# Patient Record
Sex: FEMALE | Race: WHITE | NOT HISPANIC OR LATINO | Employment: OTHER | URBAN - METROPOLITAN AREA
[De-identification: names, ages, dates, MRNs, and addresses within clinical notes are randomized per-mention and may not be internally consistent; named-entity substitution may affect disease eponyms.]

---

## 2023-02-11 ENCOUNTER — HOSPITAL ENCOUNTER (INPATIENT)
Facility: CLINIC | Age: 68
LOS: 3 days | Discharge: HOME | DRG: 872 | End: 2023-02-14
Attending: EMERGENCY MEDICINE | Admitting: FAMILY MEDICINE
Payer: MEDICARE

## 2023-02-11 ENCOUNTER — APPOINTMENT (OUTPATIENT)
Dept: CT IMAGING | Facility: CLINIC | Age: 68
DRG: 872 | End: 2023-02-11
Attending: EMERGENCY MEDICINE
Payer: MEDICARE

## 2023-02-11 DIAGNOSIS — L03.311 CELLULITIS OF ABDOMINAL WALL: Primary | ICD-10-CM

## 2023-02-11 DIAGNOSIS — L02.211 ABDOMINAL WALL ABSCESS: ICD-10-CM

## 2023-02-11 DIAGNOSIS — R50.9 FEVER, UNSPECIFIED FEVER CAUSE: ICD-10-CM

## 2023-02-11 DIAGNOSIS — D72.829 LEUKOCYTOSIS, UNSPECIFIED TYPE: ICD-10-CM

## 2023-02-11 DIAGNOSIS — E87.6 HYPOKALEMIA: ICD-10-CM

## 2023-02-11 DIAGNOSIS — L03.311 ABDOMINAL WALL CELLULITIS: ICD-10-CM

## 2023-02-11 DIAGNOSIS — A41.9 SEPSIS WITHOUT ACUTE ORGAN DYSFUNCTION, DUE TO UNSPECIFIED ORGANISM (H): ICD-10-CM

## 2023-02-11 PROBLEM — E78.00 PURE HYPERCHOLESTEROLEMIA: Status: ACTIVE | Noted: 2023-02-11

## 2023-02-11 PROBLEM — F32.A ANXIETY AND DEPRESSION: Status: ACTIVE | Noted: 2017-01-09

## 2023-02-11 PROBLEM — F41.9 ANXIETY AND DEPRESSION: Status: ACTIVE | Noted: 2017-01-09

## 2023-02-11 PROBLEM — G47.33 OBSTRUCTIVE SLEEP APNEA ON CPAP: Status: ACTIVE | Noted: 2023-02-11

## 2023-02-11 LAB
ALBUMIN SERPL-MCNC: 3.2 G/DL (ref 3.5–5)
ALP SERPL-CCNC: 92 U/L (ref 45–120)
ALT SERPL W P-5'-P-CCNC: 23 U/L (ref 0–45)
ANION GAP SERPL CALCULATED.3IONS-SCNC: 11 MMOL/L (ref 5–18)
AST SERPL W P-5'-P-CCNC: 22 U/L (ref 0–40)
BASOPHILS # BLD AUTO: 0.1 10E3/UL (ref 0–0.2)
BASOPHILS NFR BLD AUTO: 0 %
BILIRUB SERPL-MCNC: 0.6 MG/DL (ref 0–1)
BUN SERPL-MCNC: 16 MG/DL (ref 8–22)
CALCIUM SERPL-MCNC: 8.9 MG/DL (ref 8.5–10.5)
CHLORIDE BLD-SCNC: 109 MMOL/L (ref 98–107)
CO2 SERPL-SCNC: 20 MMOL/L (ref 22–31)
CREAT SERPL-MCNC: 0.65 MG/DL (ref 0.6–1.1)
EOSINOPHIL # BLD AUTO: 0.2 10E3/UL (ref 0–0.7)
EOSINOPHIL NFR BLD AUTO: 1 %
ERYTHROCYTE [DISTWIDTH] IN BLOOD BY AUTOMATED COUNT: 13.7 % (ref 10–15)
GFR SERPL CREATININE-BSD FRML MDRD: >90 ML/MIN/1.73M2
GLUCOSE BLD-MCNC: 132 MG/DL (ref 70–125)
HCT VFR BLD AUTO: 37.8 % (ref 35–47)
HGB BLD-MCNC: 12.5 G/DL (ref 11.7–15.7)
IMM GRANULOCYTES # BLD: 0.1 10E3/UL
IMM GRANULOCYTES NFR BLD: 0 %
LACTATE SERPL-SCNC: 0.7 MMOL/L (ref 0.7–2)
LYMPHOCYTES # BLD AUTO: 2.5 10E3/UL (ref 0.8–5.3)
LYMPHOCYTES NFR BLD AUTO: 14 %
MCH RBC QN AUTO: 31.1 PG (ref 26.5–33)
MCHC RBC AUTO-ENTMCNC: 33.1 G/DL (ref 31.5–36.5)
MCV RBC AUTO: 94 FL (ref 78–100)
MONOCYTES # BLD AUTO: 1.3 10E3/UL (ref 0–1.3)
MONOCYTES NFR BLD AUTO: 8 %
NEUTROPHILS # BLD AUTO: 13 10E3/UL (ref 1.6–8.3)
NEUTROPHILS NFR BLD AUTO: 77 %
NRBC # BLD AUTO: 0 10E3/UL
NRBC BLD AUTO-RTO: 0 /100
PLATELET # BLD AUTO: 202 10E3/UL (ref 150–450)
POTASSIUM BLD-SCNC: 3.4 MMOL/L (ref 3.5–5)
PROT SERPL-MCNC: 6.4 G/DL (ref 6–8)
RBC # BLD AUTO: 4.02 10E6/UL (ref 3.8–5.2)
SARS-COV-2 RNA RESP QL NAA+PROBE: NEGATIVE
SODIUM SERPL-SCNC: 140 MMOL/L (ref 136–145)
WBC # BLD AUTO: 17.1 10E3/UL (ref 4–11)

## 2023-02-11 PROCEDURE — 74177 CT ABD & PELVIS W/CONTRAST: CPT | Mod: MG

## 2023-02-11 PROCEDURE — 5A09357 ASSISTANCE WITH RESPIRATORY VENTILATION, LESS THAN 24 CONSECUTIVE HOURS, CONTINUOUS POSITIVE AIRWAY PRESSURE: ICD-10-PCS | Performed by: FAMILY MEDICINE

## 2023-02-11 PROCEDURE — 250N000011 HC RX IP 250 OP 636: Performed by: EMERGENCY MEDICINE

## 2023-02-11 PROCEDURE — 87077 CULTURE AEROBIC IDENTIFY: CPT | Performed by: EMERGENCY MEDICINE

## 2023-02-11 PROCEDURE — G1010 CDSM STANSON: HCPCS

## 2023-02-11 PROCEDURE — 87040 BLOOD CULTURE FOR BACTERIA: CPT | Performed by: EMERGENCY MEDICINE

## 2023-02-11 PROCEDURE — C9803 HOPD COVID-19 SPEC COLLECT: HCPCS

## 2023-02-11 PROCEDURE — 120N000001 HC R&B MED SURG/OB

## 2023-02-11 PROCEDURE — 83605 ASSAY OF LACTIC ACID: CPT | Performed by: EMERGENCY MEDICINE

## 2023-02-11 PROCEDURE — U0003 INFECTIOUS AGENT DETECTION BY NUCLEIC ACID (DNA OR RNA); SEVERE ACUTE RESPIRATORY SYNDROME CORONAVIRUS 2 (SARS-COV-2) (CORONAVIRUS DISEASE [COVID-19]), AMPLIFIED PROBE TECHNIQUE, MAKING USE OF HIGH THROUGHPUT TECHNOLOGIES AS DESCRIBED BY CMS-2020-01-R: HCPCS | Performed by: EMERGENCY MEDICINE

## 2023-02-11 PROCEDURE — 96365 THER/PROPH/DIAG IV INF INIT: CPT | Mod: 59

## 2023-02-11 PROCEDURE — 80053 COMPREHEN METABOLIC PANEL: CPT | Performed by: EMERGENCY MEDICINE

## 2023-02-11 PROCEDURE — 36415 COLL VENOUS BLD VENIPUNCTURE: CPT | Performed by: EMERGENCY MEDICINE

## 2023-02-11 PROCEDURE — 87205 SMEAR GRAM STAIN: CPT | Performed by: EMERGENCY MEDICINE

## 2023-02-11 PROCEDURE — 85025 COMPLETE CBC W/AUTO DIFF WBC: CPT | Performed by: EMERGENCY MEDICINE

## 2023-02-11 PROCEDURE — 96375 TX/PRO/DX INJ NEW DRUG ADDON: CPT

## 2023-02-11 PROCEDURE — 250N000013 HC RX MED GY IP 250 OP 250 PS 637: Performed by: EMERGENCY MEDICINE

## 2023-02-11 PROCEDURE — 99285 EMERGENCY DEPT VISIT HI MDM: CPT | Mod: 25

## 2023-02-11 PROCEDURE — 258N000003 HC RX IP 258 OP 636: Performed by: EMERGENCY MEDICINE

## 2023-02-11 PROCEDURE — 86140 C-REACTIVE PROTEIN: CPT | Performed by: STUDENT IN AN ORGANIZED HEALTH CARE EDUCATION/TRAINING PROGRAM

## 2023-02-11 RX ORDER — MORPHINE SULFATE 4 MG/ML
4 INJECTION, SOLUTION INTRAMUSCULAR; INTRAVENOUS ONCE
Status: COMPLETED | OUTPATIENT
Start: 2023-02-11 | End: 2023-02-11

## 2023-02-11 RX ORDER — PREDNISONE 20 MG/1
20 TABLET ORAL PRN
COMMUNITY

## 2023-02-11 RX ORDER — ALBUTEROL SULFATE 0.83 MG/ML
2.5 SOLUTION RESPIRATORY (INHALATION) EVERY 4 HOURS PRN
COMMUNITY

## 2023-02-11 RX ORDER — FLUTICASONE PROPIONATE 50 MCG
1 SPRAY, SUSPENSION (ML) NASAL DAILY PRN
COMMUNITY

## 2023-02-11 RX ORDER — CEPHALEXIN 500 MG/1
500 CAPSULE ORAL 3 TIMES DAILY
Status: ON HOLD | COMMUNITY
End: 2023-02-14

## 2023-02-11 RX ORDER — POTASSIUM CHLORIDE 1500 MG/1
20 TABLET, EXTENDED RELEASE ORAL ONCE
Status: COMPLETED | OUTPATIENT
Start: 2023-02-11 | End: 2023-02-11

## 2023-02-11 RX ORDER — MOMETASONE FUROATE 1 MG/G
CREAM TOPICAL DAILY
COMMUNITY

## 2023-02-11 RX ORDER — PIPERACILLIN SODIUM, TAZOBACTAM SODIUM 3; .375 G/15ML; G/15ML
3.38 INJECTION, POWDER, LYOPHILIZED, FOR SOLUTION INTRAVENOUS ONCE
Status: COMPLETED | OUTPATIENT
Start: 2023-02-11 | End: 2023-02-11

## 2023-02-11 RX ORDER — MULTIVITAMIN,THERAPEUTIC
1 TABLET ORAL DAILY
COMMUNITY

## 2023-02-11 RX ORDER — MONTELUKAST SODIUM 10 MG/1
10 TABLET ORAL DAILY
COMMUNITY

## 2023-02-11 RX ORDER — FLUTICASONE FUROATE AND VILANTEROL 200; 25 UG/1; UG/1
1 POWDER RESPIRATORY (INHALATION) DAILY
COMMUNITY

## 2023-02-11 RX ORDER — IOPAMIDOL 755 MG/ML
90 INJECTION, SOLUTION INTRAVASCULAR ONCE
Status: COMPLETED | OUTPATIENT
Start: 2023-02-11 | End: 2023-02-11

## 2023-02-11 RX ORDER — SERTRALINE HYDROCHLORIDE 100 MG/1
100 TABLET, FILM COATED ORAL DAILY
COMMUNITY

## 2023-02-11 RX ORDER — IPRATROPIUM BROMIDE AND ALBUTEROL SULFATE 2.5; .5 MG/3ML; MG/3ML
1 SOLUTION RESPIRATORY (INHALATION) EVERY 6 HOURS PRN
COMMUNITY

## 2023-02-11 RX ADMIN — VANCOMYCIN HYDROCHLORIDE 2500 MG: 5 INJECTION, POWDER, LYOPHILIZED, FOR SOLUTION INTRAVENOUS at 23:56

## 2023-02-11 RX ADMIN — MORPHINE SULFATE 4 MG: 4 INJECTION, SOLUTION INTRAMUSCULAR; INTRAVENOUS at 22:36

## 2023-02-11 RX ADMIN — POTASSIUM CHLORIDE 20 MEQ: 1500 TABLET, EXTENDED RELEASE ORAL at 23:13

## 2023-02-11 RX ADMIN — PIPERACILLIN AND TAZOBACTAM 3.38 G: 3; .375 INJECTION, POWDER, FOR SOLUTION INTRAVENOUS at 22:44

## 2023-02-11 RX ADMIN — IOPAMIDOL 90 ML: 755 INJECTION, SOLUTION INTRAVENOUS at 23:36

## 2023-02-11 ASSESSMENT — ACTIVITIES OF DAILY LIVING (ADL): ADLS_ACUITY_SCORE: 35

## 2023-02-12 LAB
ANION GAP SERPL CALCULATED.3IONS-SCNC: 8 MMOL/L (ref 5–18)
BUN SERPL-MCNC: 12 MG/DL (ref 8–22)
C REACTIVE PROTEIN LHE: 15.3 MG/DL (ref 0–?)
C REACTIVE PROTEIN LHE: 19.2 MG/DL (ref 0–?)
CALCIUM SERPL-MCNC: 8.9 MG/DL (ref 8.5–10.5)
CHLORIDE BLD-SCNC: 111 MMOL/L (ref 98–107)
CO2 SERPL-SCNC: 23 MMOL/L (ref 22–31)
CREAT SERPL-MCNC: 0.64 MG/DL (ref 0.6–1.1)
ERYTHROCYTE [DISTWIDTH] IN BLOOD BY AUTOMATED COUNT: 13.7 % (ref 10–15)
GFR SERPL CREATININE-BSD FRML MDRD: >90 ML/MIN/1.73M2
GLUCOSE BLD-MCNC: 99 MG/DL (ref 70–125)
HCT VFR BLD AUTO: 39 % (ref 35–47)
HGB BLD-MCNC: 12.6 G/DL (ref 11.7–15.7)
MCH RBC QN AUTO: 31.1 PG (ref 26.5–33)
MCHC RBC AUTO-ENTMCNC: 32.3 G/DL (ref 31.5–36.5)
MCV RBC AUTO: 96 FL (ref 78–100)
PLATELET # BLD AUTO: 206 10E3/UL (ref 150–450)
POTASSIUM BLD-SCNC: 4 MMOL/L (ref 3.5–5)
RBC # BLD AUTO: 4.05 10E6/UL (ref 3.8–5.2)
SODIUM SERPL-SCNC: 142 MMOL/L (ref 136–145)
WBC # BLD AUTO: 14.1 10E3/UL (ref 4–11)

## 2023-02-12 PROCEDURE — 36415 COLL VENOUS BLD VENIPUNCTURE: CPT | Performed by: STUDENT IN AN ORGANIZED HEALTH CARE EDUCATION/TRAINING PROGRAM

## 2023-02-12 PROCEDURE — 120N000001 HC R&B MED SURG/OB

## 2023-02-12 PROCEDURE — 85027 COMPLETE CBC AUTOMATED: CPT | Performed by: STUDENT IN AN ORGANIZED HEALTH CARE EDUCATION/TRAINING PROGRAM

## 2023-02-12 PROCEDURE — 250N000011 HC RX IP 250 OP 636: Performed by: STUDENT IN AN ORGANIZED HEALTH CARE EDUCATION/TRAINING PROGRAM

## 2023-02-12 PROCEDURE — 80048 BASIC METABOLIC PNL TOTAL CA: CPT | Performed by: STUDENT IN AN ORGANIZED HEALTH CARE EDUCATION/TRAINING PROGRAM

## 2023-02-12 PROCEDURE — 258N000003 HC RX IP 258 OP 636: Performed by: STUDENT IN AN ORGANIZED HEALTH CARE EDUCATION/TRAINING PROGRAM

## 2023-02-12 PROCEDURE — 99223 1ST HOSP IP/OBS HIGH 75: CPT | Mod: AI | Performed by: STUDENT IN AN ORGANIZED HEALTH CARE EDUCATION/TRAINING PROGRAM

## 2023-02-12 PROCEDURE — 250N000013 HC RX MED GY IP 250 OP 250 PS 637: Performed by: STUDENT IN AN ORGANIZED HEALTH CARE EDUCATION/TRAINING PROGRAM

## 2023-02-12 PROCEDURE — 86140 C-REACTIVE PROTEIN: CPT | Performed by: STUDENT IN AN ORGANIZED HEALTH CARE EDUCATION/TRAINING PROGRAM

## 2023-02-12 RX ORDER — ENOXAPARIN SODIUM 100 MG/ML
40 INJECTION SUBCUTANEOUS EVERY 24 HOURS
Status: DISCONTINUED | OUTPATIENT
Start: 2023-02-12 | End: 2023-02-14 | Stop reason: HOSPADM

## 2023-02-12 RX ORDER — CETIRIZINE HYDROCHLORIDE 10 MG/1
10 TABLET ORAL DAILY PRN
Status: DISCONTINUED | OUTPATIENT
Start: 2023-02-12 | End: 2023-02-14 | Stop reason: HOSPADM

## 2023-02-12 RX ORDER — IPRATROPIUM BROMIDE AND ALBUTEROL SULFATE 2.5; .5 MG/3ML; MG/3ML
1 SOLUTION RESPIRATORY (INHALATION) EVERY 6 HOURS PRN
Status: DISCONTINUED | OUTPATIENT
Start: 2023-02-12 | End: 2023-02-14 | Stop reason: HOSPADM

## 2023-02-12 RX ORDER — FLUTICASONE FUROATE AND VILANTEROL 200; 25 UG/1; UG/1
1 POWDER RESPIRATORY (INHALATION) DAILY
Status: DISCONTINUED | OUTPATIENT
Start: 2023-02-12 | End: 2023-02-14 | Stop reason: HOSPADM

## 2023-02-12 RX ORDER — ONDANSETRON 4 MG/1
4 TABLET, ORALLY DISINTEGRATING ORAL EVERY 6 HOURS PRN
Status: DISCONTINUED | OUTPATIENT
Start: 2023-02-12 | End: 2023-02-14 | Stop reason: HOSPADM

## 2023-02-12 RX ORDER — ALBUTEROL SULFATE 0.83 MG/ML
2.5 SOLUTION RESPIRATORY (INHALATION) EVERY 4 HOURS PRN
Status: DISCONTINUED | OUTPATIENT
Start: 2023-02-12 | End: 2023-02-14 | Stop reason: HOSPADM

## 2023-02-12 RX ORDER — POLYETHYLENE GLYCOL 3350 17 G/17G
17 POWDER, FOR SOLUTION ORAL DAILY PRN
Status: DISCONTINUED | OUTPATIENT
Start: 2023-02-12 | End: 2023-02-14 | Stop reason: HOSPADM

## 2023-02-12 RX ORDER — PIPERACILLIN SODIUM, TAZOBACTAM SODIUM 3; .375 G/15ML; G/15ML
3.38 INJECTION, POWDER, LYOPHILIZED, FOR SOLUTION INTRAVENOUS EVERY 8 HOURS
Status: DISCONTINUED | OUTPATIENT
Start: 2023-02-12 | End: 2023-02-13

## 2023-02-12 RX ORDER — ACETAMINOPHEN 325 MG/1
650 TABLET ORAL EVERY 6 HOURS PRN
Status: DISCONTINUED | OUTPATIENT
Start: 2023-02-12 | End: 2023-02-14 | Stop reason: HOSPADM

## 2023-02-12 RX ORDER — PANTOPRAZOLE SODIUM 40 MG/1
40 TABLET, DELAYED RELEASE ORAL 2 TIMES DAILY
Status: DISCONTINUED | OUTPATIENT
Start: 2023-02-12 | End: 2023-02-14 | Stop reason: HOSPADM

## 2023-02-12 RX ORDER — ONDANSETRON 2 MG/ML
4 INJECTION INTRAMUSCULAR; INTRAVENOUS EVERY 6 HOURS PRN
Status: DISCONTINUED | OUTPATIENT
Start: 2023-02-12 | End: 2023-02-14 | Stop reason: HOSPADM

## 2023-02-12 RX ORDER — ACETAMINOPHEN 650 MG/1
650 SUPPOSITORY RECTAL EVERY 6 HOURS PRN
Status: DISCONTINUED | OUTPATIENT
Start: 2023-02-12 | End: 2023-02-14 | Stop reason: HOSPADM

## 2023-02-12 RX ORDER — LIDOCAINE 40 MG/G
CREAM TOPICAL
Status: DISCONTINUED | OUTPATIENT
Start: 2023-02-12 | End: 2023-02-14 | Stop reason: HOSPADM

## 2023-02-12 RX ORDER — MONTELUKAST SODIUM 10 MG/1
10 TABLET ORAL DAILY
Status: DISCONTINUED | OUTPATIENT
Start: 2023-02-12 | End: 2023-02-14 | Stop reason: HOSPADM

## 2023-02-12 RX ADMIN — VANCOMYCIN HYDROCHLORIDE 1750 MG: 5 INJECTION, POWDER, LYOPHILIZED, FOR SOLUTION INTRAVENOUS at 18:02

## 2023-02-12 RX ADMIN — ACETAMINOPHEN 650 MG: 325 TABLET ORAL at 05:12

## 2023-02-12 RX ADMIN — PIPERACILLIN AND TAZOBACTAM 3.38 G: 3; .375 INJECTION, POWDER, FOR SOLUTION INTRAVENOUS at 20:47

## 2023-02-12 RX ADMIN — PANTOPRAZOLE SODIUM 40 MG: 40 TABLET, DELAYED RELEASE ORAL at 08:17

## 2023-02-12 RX ADMIN — SERTRALINE 100 MG: 50 TABLET, FILM COATED ORAL at 08:17

## 2023-02-12 RX ADMIN — ACETAMINOPHEN 650 MG: 325 TABLET ORAL at 14:47

## 2023-02-12 RX ADMIN — PIPERACILLIN AND TAZOBACTAM 3.38 G: 3; .375 INJECTION, POWDER, FOR SOLUTION INTRAVENOUS at 05:02

## 2023-02-12 RX ADMIN — ENOXAPARIN SODIUM 40 MG: 100 INJECTION SUBCUTANEOUS at 05:02

## 2023-02-12 RX ADMIN — PIPERACILLIN AND TAZOBACTAM 3.38 G: 3; .375 INJECTION, POWDER, FOR SOLUTION INTRAVENOUS at 12:26

## 2023-02-12 RX ADMIN — PANTOPRAZOLE SODIUM 40 MG: 40 TABLET, DELAYED RELEASE ORAL at 19:54

## 2023-02-12 RX ADMIN — FLUTICASONE FUROATE AND VILANTEROL 1 PUFF: 200; 25 POWDER RESPIRATORY (INHALATION) at 09:32

## 2023-02-12 RX ADMIN — MONTELUKAST 10 MG: 10 TABLET, FILM COATED ORAL at 08:18

## 2023-02-12 RX ADMIN — ACETAMINOPHEN 650 MG: 325 TABLET ORAL at 23:42

## 2023-02-12 ASSESSMENT — ACTIVITIES OF DAILY LIVING (ADL)
ADLS_ACUITY_SCORE: 20
ADLS_ACUITY_SCORE: 37
ADLS_ACUITY_SCORE: 20
ADLS_ACUITY_SCORE: 20
ADLS_ACUITY_SCORE: 35
ADLS_ACUITY_SCORE: 20
ADLS_ACUITY_SCORE: 37

## 2023-02-12 NOTE — PHARMACY-VANCOMYCIN DOSING SERVICE
Pharmacy Vancomycin Initial Note  Date of Service 2023  Patient's  1955  67 year old, female    Indication: Skin and Soft Tissue Infection    Current estimated CrCl = Estimated Creatinine Clearance: 109.7 mL/min (based on SCr of 0.65 mg/dL).    Creatinine for last 3 days  2023: 10:30 PM Creatinine 0.65 mg/dL    Recent Vancomycin Level(s) for last 3 days  No results found for requested labs within last 72 hours.      Vancomycin IV Administrations (past 72 hours)                   vancomycin (VANCOCIN) 2,500 mg in sodium chloride 0.9 % 500 mL intermittent infusion (mg) 2,500 mg New Bag 23 2356                Nephrotoxins and other renal medications (From now, onward)    Start     Dose/Rate Route Frequency Ordered Stop    23 1800  vancomycin (VANCOCIN) 1,750 mg in 0.9% NaCl 500 mL intermittent infusion         1,750 mg  over 2 Hours Intravenous EVERY 18 HOURS 23 0130      23 0430  piperacillin-tazobactam (ZOSYN) 3.375 g vial to attach to  mL bag         3.375 g  over 240 Minutes Intravenous EVERY 8 HOURS 23 0114      23 2300  vancomycin (VANCOCIN) 2,500 mg in sodium chloride 0.9 % 500 mL intermittent infusion         2,500 mg  over 120 Minutes Intravenous ONCE 23 2250            Contrast Orders - past 72 hours (72h ago, onward)    Start     Dose/Rate Route Frequency Stop    23 2330  iopamidol (ISOVUE-370) solution 90 mL         90 mL Intravenous ONCE 23 2336          [employer] Prediction of Planned Initial Vancomycin Regimen  Regimen: 1750 mg IV every 18 hours.  Start time: 1800 on 2023  Exposure target: AUC24 (range)400-600 mg/L.hr   AUC24,ss: 568 mg/L.hr  Probability of AUC24 > 400: 74 %  Ctrough,ss: 13.6 mg/L  Probability of Ctrough,ss > 20: 34 %  Probability of nephrotoxicity (Lodise BLANCA ): 9 %          Plan:  1. Start vancomycin  1750 mg IV q18h.   2. Vancomycin monitoring method: AUC  3. Vancomycin therapeutic  monitoring goal: 400-600 mg*h/L  4. Pharmacy will check vancomycin levels as appropriate in 1-3 Days.    5. Serum creatinine levels will be ordered every 48 hours.      Vikas Hogan RP

## 2023-02-12 NOTE — PHARMACY-VANCOMYCIN DOSING SERVICE
Pharmacy Vancomycin Initial Note  Date of Service 2023  Patient's  1955  67 year old, female    Indication: Sepsis and Skin and Soft Tissue Infection    Current estimated CrCl = CrCl cannot be calculated (No successful lab value found.).    Creatinine for last 3 days  No results found for requested labs within last 72 hours.    Recent Vancomycin Level(s) for last 3 days  No results found for requested labs within last 72 hours.      Vancomycin IV Administrations (past 72 hours)      No vancomycin orders with administrations in past 72 hours.                Nephrotoxins and other renal medications (From now, onward)    Start     Dose/Rate Route Frequency Ordered Stop    23 2300  vancomycin (VANCOCIN) 2,500 mg in sodium chloride 0.9 % 500 mL intermittent infusion         2,500 mg  over 120 Minutes Intravenous ONCE 23 2250      23 2200  piperacillin-tazobactam (ZOSYN) 3.375 g vial to attach to  mL bag         3.375 g  over 30 Minutes Intravenous ONCE 23 2144            Contrast Orders - past 72 hours (72h ago, onward)    None                  Plan:  1. Start vancomycin  2500 mg IV x1 in ED.   2. If Vancomycin to continue as inpatient, please order pharmacy to dose consult.    Joshua Lantigua, [facility]

## 2023-02-12 NOTE — ED PROVIDER NOTES
EMERGENCY DEPARTMENT ENCOUNTER      NAME: Bernice Umaña  AGE: 67 year old female  YOB: 1955  MRN: [id number]  EVALUATION DATE & TIME: 2023  9:36 PM    PCP: Lyly Tesfaye    ED PROVIDER: Ann Everett MD    Chief Complaint   Patient presents with     Rash     Fever         FINAL IMPRESSION:  1. Cellulitis of abdominal wall    2. Abdominal wall cellulitis    3. Abdominal wall abscess    4. Sepsis without acute organ dysfunction, due to unspecified organism (H)    5. Fever, unspecified fever cause    6. Leukocytosis, unspecified type    7. Hypokalemia          ED COURSE & MEDICAL DECISION MAKIN:43 PM I met with patient for initial interview and encounter. PPE worn includes surgical mask.    Pertinent Labs & Imaging studies reviewed. (See chart for details)  67 year old female with history of anxiety/depression, HLD, asthma, GLDAYS on CPAP who presents to the Emergency Department for evaluation of 4 days of a rash on her abdominal wall with new fever to 102 today.  Patient has erythema of the abdominal wall with a small amount of purulent drainage, associated induration consistent with abdominal wall abscess, cellulitis.  With fever at home concerns for sepsis or peritoneal spread though really the remainder of her abdomen is benign.    Patient placed on monitor, IV established and blood obtained.  Given morphine for pain, Zosyn/vancomycin.  CBC, CMP, lactate, blood culture, wound culture, COVID swab notable for WBC of 17.1, potassium 3.4 replaced orally.  CT abdomen pelvis cellulitis.  Clinically nothing to suggest necrotizing infection.       ED Course as of 02/15/23 2050   Sat 2023   2245 WBC(!): 17.1   2301 Potassium(!): 3.4       Medical Decision Making    History:    Supplemental history from: Family Member/Significant Other    External Record(s) reviewed: Documented in chart, if applicable.    Work Up:    Chart documentation includes differential considered and any EKGs or imaging  independently interpreted by provider, where specified.    In additional to work up documented, I considered the following work up: Documented in chart, if applicable.    External consultation:    Discussion of management with another provider: Documented in chart, if applicable    Complicating factors:    Care impacted by chronic illness: Chronic Lung Disease and Hyperlipidemia    Care affected by social determinants of health: N/A    Disposition considerations: Admit.        At the conclusion of the encounter I discussed the results of all of the tests and the disposition. The questions were answered. The patient or family acknowledged understanding and was agreeable with the care plan.    CONSULTS:  Hospitalist    CRITICAL CARE:  Critical Care  Performed by: Ann Everett MD  Authorized by: Ann Everett MD     Total critical care time: 35 minutes  Criticalcare time was exclusive of separately billable procedures and treating other patients.  Critical care was necessary to treat or prevent imminent or life-threatening deterioration of the following conditions: Sepsis  Critical care was time spent personally by me on the following activities: development of treatment plan with patient or surrogate, discussions with consultants, examination of patient, evaluation of patient's response totreatment, obtaining history from patient or surrogate, ordering and performing treatments and interventions, ordering and review of laboratory studies, ordering and review of radiographic studies and re-evaluation ofpatient's condition, this excludes any separately billable procedures.      MEDICATIONS GIVEN IN THE EMERGENCY:  Medications   piperacillin-tazobactam (ZOSYN) 3.375 g vial to attach to  mL bag (0 g Intravenous Stopped 2/11/23 2315)   morphine (PF) injection 4 mg (4 mg Intravenous Given 2/11/23 2236)   vancomycin (VANCOCIN) 2,500 mg in sodium chloride 0.9 % 500 mL intermittent infusion (2,500 mg Intravenous New  Bag 23 2751)   potassium chloride ER (KLOR-CON M) CR tablet 20 mEq (20 mEq Oral Given 23 2313)   iopamidol (ISOVUE-370) solution 90 mL (90 mLs Intravenous Given 23 2406)       NEW PRESCRIPTIONS STARTED AT TODAY'S ER VISIT  Discharge Medication List as of 2023 11:02 AM             =================================================================    HPI    Patient information was obtained from: Patient     Use of Intrepreter: N/A        Bernice Umaña is a 67 year old female with pertinent medical history of GERD, anxiety and depression, who presents via walk-in for evaluation of rash, fever.    Patient reports she has had an area of redness surround a small pustule on her left upper abdomin which has been spreading for the pas 4 days. She adds that this pustule began to drain about 2 days ago, and she recorded a fever of 102.5 at home. She has been taking tylenol for this. She was seen for a virtual visit yesterday and was started on a course of cephalexin. She has had 3 doses thus far and has not had any improvement. She denies any other complaints.      REVIEW OF SYSTEMS  Constitutional:  Denies fever, chills, weight loss or weakness  GI:  Denies abdominal pain, nausea, vomiting, diarrhea  : Denies dysuria, denies hematuria  Skin: Positive for redness and drainage to abdomin. Denies rash, pallor      PAST MEDICAL HISTORY:  Past Medical History:   Diagnosis Date     Arthritis      Asthma      Gastro-oesophageal reflux disease        PAST SURGICAL HISTORY:  Past Surgical History:   Procedure Laterality Date     ARTHROPLASTY KNEE BILATERAL  10/8/2012    Procedure: ARTHROPLASTY KNEE BILATERAL;  Bilateral Total Knee Arthroplasty;  Surgeon: Nino Andre MD;  Location: [facility] OR     HYSTERECTOMY         CURRENT MEDICATIONS:    Prior to Admission Medications   Prescriptions Last Dose Informant Patient Reported? Taking?   COMPRESSION STOCKINGS   No No   Si each continuous.   COMPRESSION STOCKINGS    No No   Si each continuous.   OMEPRAZOLE PO 2023 at am  Yes Yes   Sig: Take 40 mg by mouth daily   VITAMIN D, CHOLECALCIFEROL, PO 2023 at am  Yes Yes   Sig: Take 2,000 Units by mouth daily   albuterol (PROVENTIL) (2.5 MG/3ML) 0.083% neb solution prn  Yes Yes   Sig: Take 2.5 mg by nebulization every 4 hours as needed for shortness of breath, wheezing or cough   cephALEXin (KEFLEX) 500 MG capsule 2023 at x3  Yes No   Sig: Take 500 mg by mouth 3 times daily   cetirizine (ZYRTEC) 10 MG tablet 2023 at am  Yes Yes   Sig: Take 10 mg by mouth daily as needed.     fluticasone (FLONASE) 50 MCG/ACT nasal spray Past Month  Yes Yes   Sig: Spray 1 spray into both nostrils daily as needed for rhinitis or allergies   fluticasone-vilanterol (BREO ELLIPTA) 200-25 MCG/ACT inhaler 2023 at am - family can bring in  Yes Yes   Sig: Inhale 1 puff into the lungs daily   ipratropium - albuterol 0.5 mg/2.5 mg/3 mL (DUONEB) 0.5-2.5 (3) MG/3ML neb solution Past Month  Yes Yes   Sig: Take 1 vial by nebulization every 6 hours as needed for shortness of breath, wheezing or cough   metroNIDAZOLE (METROCREAM) 0.75 % external cream 2023 at pm  Yes No   Sig: Apply topically 2 times daily   mometasone (ELOCON) 0.1 % external cream 2023 at am  Yes Yes   Sig: Apply topically daily   montelukast (SINGULAIR) 10 MG tablet 2023 at am  Yes Yes   Sig: Take 10 mg by mouth daily   multivitamin, therapeutic (THERA-VIT) TABS tablet 2023 at am  Yes Yes   Sig: Take 1 tablet by mouth daily   predniSONE (DELTASONE) 20 MG tablet More than a month  Yes Yes   Sig: Take 20 mg by mouth as needed As directed   sertraline (ZOLOFT) 100 MG tablet 2023  Yes Yes   Sig: Take 100 mg by mouth daily      Facility-Administered Medications: None       ALLERGIES:  Allergies   Allergen Reactions     Sulfa Drugs Hives     Tramadol Nausea     Macrobid [Nitrofurantoin Anhydrous] Rash       FAMILY HISTORY:  No family history on  file.    SOCIAL HISTORY:  Social History     Tobacco Use     Smoking status: Never     Smokeless tobacco: Never   Substance Use Topics     Alcohol use: Yes     Comment: 2 monthly     Drug use: No        VITALS:  No data found.    PHYSICAL EXAM    General Appearance: Well-appearing, well-nourished, no acute distress   Head:  Normocephalic  Eyes:  conjunctiva/corneas clear  ENT:  membranes are moist without pallor  Cardio:  Regular rate and rhythm  Pulm:  No respiratory distress  Back: No CVA tenderness, normal ROM  Abdomen:  Obese, soft, non-tender, non distended,no rebound or guarding.  Extremities: Normal gait  Skin: Large area of erythema to left upper abdominal wall with induration and a small pustule, able to express a small amount of purulent drainage, tender over area of erythema. Skin warm, dry,  Neuro:  Alert and oriented ×3, moving all extremities, no gross sensory defects               RADIOLOGY/LABS:  Reviewed all pertinent imaging. Please see official radiology report. All pertinent labs reviewed and interpreted.    Results for orders placed or performed during the hospital encounter of 02/11/23   CT Abdomen Pelvis w Contrast    Impression    IMPRESSION:   1.  Anterior left abdominal wall cellulitis, extending deep to abut the superficial fascia of the left abdominal wall musculature, which could reflect a superimposed superficial fasciitis. No soft tissue gas to suggest convincing CT evidence of a   necrotizing infection. This does not exclude a clinical diagnosis of necrotizing infection.  2.  No CT evidence of acute intraperitoneal process.       Comprehensive metabolic panel   Result Value Ref Range    Sodium 140 136 - 145 mmol/L    Potassium 3.4 (L) 3.5 - 5.0 mmol/L    Chloride 109 (H) 98 - 107 mmol/L    Carbon Dioxide (CO2) 20 (L) 22 - 31 mmol/L    Anion Gap 11 5 - 18 mmol/L    Urea Nitrogen 16 8 - 22 mg/dL    Creatinine 0.65 0.60 - 1.10 mg/dL    Calcium 8.9 8.5 - 10.5 mg/dL    Glucose 132 (H) 70 -  125 mg/dL    Alkaline Phosphatase 92 45 - 120 U/L    AST 22 0 - 40 U/L    ALT 23 0 - 45 U/L    Protein Total 6.4 6.0 - 8.0 g/dL    Albumin 3.2 (L) 3.5 - 5.0 g/dL    Bilirubin Total 0.6 0.0 - 1.0 mg/dL    GFR Estimate >90 >60 mL/min/1.73m2   Lactic acid whole blood   Result Value Ref Range    Lactic Acid 0.7 0.7 - 2.0 mmol/L   Asymptomatic COVID-19 Virus (Coronavirus) by PCR Nasopharyngeal    Specimen: Nasopharyngeal; Swab   Result Value Ref Range    SARS CoV2 PCR Negative Negative   CBC with platelets and differential   Result Value Ref Range    WBC Count 17.1 (H) 4.0 - 11.0 10e3/uL    RBC Count 4.02 3.80 - 5.20 10e6/uL    Hemoglobin 12.5 11.7 - 15.7 g/dL    Hematocrit 37.8 35.0 - 47.0 %    MCV 94 78 - 100 fL    MCH 31.1 26.5 - 33.0 pg    MCHC 33.1 31.5 - 36.5 g/dL    RDW 13.7 10.0 - 15.0 %    Platelet Count 202 150 - 450 10e3/uL    % Neutrophils 77 %    % Lymphocytes 14 %    % Monocytes 8 %    % Eosinophils 1 %    % Basophils 0 %    % Immature Granulocytes 0 %    NRBCs per 100 WBC 0 <1 /100    Absolute Neutrophils 13.0 (H) 1.6 - 8.3 10e3/uL    Absolute Lymphocytes 2.5 0.8 - 5.3 10e3/uL    Absolute Monocytes 1.3 0.0 - 1.3 10e3/uL    Absolute Eosinophils 0.2 0.0 - 0.7 10e3/uL    Absolute Basophils 0.1 0.0 - 0.2 10e3/uL    Absolute Immature Granulocytes 0.1 <=0.4 10e3/uL    Absolute NRBCs 0.0 10e3/uL   CRP inflammation   Result Value Ref Range    CRP 15.3 (H) 0.0 - <0.8 mg/dL   Basic metabolic panel   Result Value Ref Range    Sodium 142 136 - 145 mmol/L    Potassium 4.0 3.5 - 5.0 mmol/L    Chloride 111 (H) 98 - 107 mmol/L    Carbon Dioxide (CO2) 23 22 - 31 mmol/L    Anion Gap 8 5 - 18 mmol/L    Urea Nitrogen 12 8 - 22 mg/dL    Creatinine 0.64 0.60 - 1.10 mg/dL    Calcium 8.9 8.5 - 10.5 mg/dL    Glucose 99 70 - 125 mg/dL    GFR Estimate >90 >60 mL/min/1.73m2   CBC with platelets   Result Value Ref Range    WBC Count 14.1 (H) 4.0 - 11.0 10e3/uL    RBC Count 4.05 3.80 - 5.20 10e6/uL    Hemoglobin 12.6 11.7 - 15.7 g/dL     Hematocrit 39.0 35.0 - 47.0 %    MCV 96 78 - 100 fL    MCH 31.1 26.5 - 33.0 pg    MCHC 32.3 31.5 - 36.5 g/dL    RDW 13.7 10.0 - 15.0 %    Platelet Count 206 150 - 450 10e3/uL   CRP inflammation   Result Value Ref Range    CRP 19.2 (H) 0.0 - <0.8 mg/dL   Creatinine   Result Value Ref Range    Creatinine 0.65 0.60 - 1.10 mg/dL    GFR Estimate >90 >60 mL/min/1.73m2   CBC with platelets   Result Value Ref Range    WBC Count 8.9 4.0 - 11.0 10e3/uL    RBC Count 3.84 3.80 - 5.20 10e6/uL    Hemoglobin 12.0 11.7 - 15.7 g/dL    Hematocrit 37.7 35.0 - 47.0 %    MCV 98 78 - 100 fL    MCH 31.3 26.5 - 33.0 pg    MCHC 31.8 31.5 - 36.5 g/dL    RDW 13.7 10.0 - 15.0 %    Platelet Count 217 150 - 450 10e3/uL   CRP inflammation   Result Value Ref Range    CRP 13.9 (H) 0.0 - <0.8 mg/dL   Basic metabolic panel   Result Value Ref Range    Sodium 145 136 - 145 mmol/L    Potassium 3.9 3.5 - 5.0 mmol/L    Chloride 112 (H) 98 - 107 mmol/L    Carbon Dioxide (CO2) 24 22 - 31 mmol/L    Anion Gap 9 5 - 18 mmol/L    Urea Nitrogen 9 8 - 22 mg/dL    Creatinine 0.65 0.60 - 1.10 mg/dL    Calcium 8.8 8.5 - 10.5 mg/dL    Glucose 91 70 - 125 mg/dL    GFR Estimate >90 >60 mL/min/1.73m2   Result Value Ref Range    Potassium 3.6 3.5 - 5.0 mmol/L   Extra Green Top (Lithium Heparin) Tube   Result Value Ref Range    Hold Specimen JIC    Extra Purple Top Tube   Result Value Ref Range    Hold Specimen JIC    Blood Culture Peripheral Blood    Specimen: Peripheral Blood   Result Value Ref Range    Culture No growth after 3 days    Blood Culture Line, venous    Specimen: Line, venous; Blood   Result Value Ref Range    Culture No growth after 3 days    Wound Aerobic Bacterial Culture Routine with Gram Stain    Specimen: Abdomen; Wound   Result Value Ref Range    Culture 1+ Staphylococcus aureus (A)     Culture 1+ Normal bryson     Gram Stain Result No organisms seen     Gram Stain Result No white blood cells seen        Susceptibility    Staphylococcus aureus -  JAMES     Oxacillin*  Susceptible ug/mL      * Oxacillin susceptible isolates are susceptible to cephalosporins (example: cefazolin and cephalexin) and beta lactam combination agents. Oxacillin resistant isolates are resistant to these agents.     Gentamicin  Susceptible ug/mL     Erythromycin  Susceptible ug/mL     Clindamycin  Susceptible ug/mL     Vancomycin  Susceptible ug/mL     Tetracycline  Susceptible ug/mL     Trimethoprim/Sulfamethoxazole  Susceptible ug/mL           The creation of this record is based on the scribe s observations of the work being performed by Ann Everett MD and the provider s statements to them. It was created on her behalf by Ross Saab, a trained medical scribe. This document has been checked and approved by the attending provider.    Ann Everett MD  Emergency Medicine  [facility] EMERGENCY ROOM  [address] [address]  [phone]  Dept: [phone]     Ann Everett MD  02/15/23 0310

## 2023-02-12 NOTE — PHARMACY-ADMISSION MEDICATION HISTORY
Pharmacy Note - Admission Medication History    Pertinent Provider Information:    ______________________________________________________________________    Prior To Admission (PTA) med list completed and updated in EMR.       PTA Med List   Medication Sig Last Dose     albuterol (PROVENTIL) (2.5 MG/3ML) 0.083% neb solution Take 2.5 mg by nebulization every 4 hours as needed for shortness of breath, wheezing or cough prn     cephALEXin (KEFLEX) 500 MG capsule Take 500 mg by mouth 3 times daily 2/11/2023 at x3     cetirizine (ZYRTEC) 10 MG tablet Take 10 mg by mouth daily as needed.   2/11/2023 at am     fluticasone (FLONASE) 50 MCG/ACT nasal spray Spray 1 spray into both nostrils daily as needed for rhinitis or allergies Past Month     fluticasone-vilanterol (BREO ELLIPTA) 200-25 MCG/ACT inhaler Inhale 1 puff into the lungs daily 2/11/2023 at am - family can bring in     ipratropium - albuterol 0.5 mg/2.5 mg/3 mL (DUONEB) 0.5-2.5 (3) MG/3ML neb solution Take 1 vial by nebulization every 6 hours as needed for shortness of breath, wheezing or cough Past Month     metroNIDAZOLE (METROCREAM) 0.75 % external cream Apply topically 2 times daily 2/11/2023 at pm     mometasone (ELOCON) 0.1 % external cream Apply topically daily 2/11/2023 at am     montelukast (SINGULAIR) 10 MG tablet Take 10 mg by mouth daily 2/11/2023 at am     multivitamin, therapeutic (THERA-VIT) TABS tablet Take 1 tablet by mouth daily 2/11/2023 at am     OMEPRAZOLE PO Take 40 mg by mouth daily 2/11/2023 at am     predniSONE (DELTASONE) 20 MG tablet Take 20 mg by mouth as needed As directed More than a month     sertraline (ZOLOFT) 100 MG tablet Take 100 mg by mouth daily 2/11/2023     VITAMIN D, CHOLECALCIFEROL, PO Take 2,000 Units by mouth daily 2/11/2023 at am       Information source(s): Patient, Family member, Facility ([facility]/) medication list/MAR and CareEverywhere/SureScripts    Method of interview communication: in-person    Patient was asked  about OTC/herbal products specifically.  PTA med list reflects this.    Based on the pharmacist's assessment, the PTA med list information appears reliable    Medication Affordability:  Not including over the counter (OTC) medications, was there a time in the past 12 months when you did not take your medications as prescribed because of cost?: No    Allergies were reviewed, assessed, and updated with the patient.      Medications available for use during hospital stay: Family can bring in Breo Ellipta.      Thank you for the opportunity to participate in the care of this patient.      Joshua Lantigua MANOLO     2/11/2023     10:47 PM

## 2023-02-12 NOTE — H&P
"    [facility]    History and Physical - Hospitalist Service       Date of Admission:  2/11/2023    Assessment & Plan      Bernice Umaña is a 67 year old female admitted on 2/11/2023. She has a history of asthma, anxiety, depression, bilateral knee replacement and GERD and is admitted for cellulitis  Abdominal wall cellulitis  Fever  Leukocytosis  Erythema 25 x 15 cm with purulent discharge, tender to touch and warm.  Fever of 102.5.  Leukocytosis, 17.1.  CRP of 15.3.  Abdominal CT revealed anterior left abdominal wall cellulitis, extending deep to abut the superficial fascia of the left abdominal wall musculature, no evidence of acute intraperitoneal process.  Given the exam finding, positive lab work and the CT finding, all these finding consistent with cellulitis but not erysipelas which is superficial skin infection.  Also, no fluctuation which will exclude skin abscess    -Follow-up blood culture  -Tylenol for fever and pain  -Zosyn  -Vanco, pharmacy to dose    Hypokalemia  -Potassium replacement protocol    Chronic conditions  Asthma  -Continue PTA Singulair  -Continue PTA Breo Ellipta  -Continue PTA albuterol as needed  Depression  -Continue PTA Zoloft  GERD.    -Continue PTA omeprazole     Diet: Regular Diet Adult  DVT Prophylaxis: Enoxaparin (Lovenox) SQ  Salvador Catheter: Not present  Fluids: PO  Lines: None     Cardiac Monitoring: None  Code Status: Full Code    Clinically Significant Risk Factors Present on Admission              # Hypoalbuminemia: Lowest albumin = 3.2 g/dL at 2/11/2023 10:30 PM, will monitor as appropriate          # Obesity: Estimated body mass index is 39.98 kg/m  as calculated from the following:    Height as of this encounter: 1.676 m (5' 6\").    Weight as of this encounter: 112.4 kg (247 lb 11.2 oz).           Disposition Plan      Expected Discharge Date: 02/13/2023                The patient's care was discussed with the Attending Physician, Dr." Lee.      Julissa Mistry MD  Hospitalist Service  [facility]  Securely message with [employer] (more info)  Text page via [facility] Paging/Directory   ______________________________________________________________________    Chief Complaint   Fever and rash    History is obtained from the patient    History of Present Illness   Bernice Umaña is a 67 year old female who has a history of anxiety, depression, asthma, bilateral total knee replacement and GERD with and is admitted for fever and rash  Patient reports redness area does surround small pustule on the left upper abdominal wall.  The red area started small then increased in size over the last 4 days and associated with yellowish/greenish discharge.  Also endorses pain from the area, chills, fever of 102.5 at home and nausea but no vomiting.  Patient has virtual encounter and she was prescribed cephalexin which she received 3 doses without improvement.  Patient denies trauma to the area, insect bite, urinary symptoms, constipation or diarrhea.        Past Medical History    Past Medical History:   Diagnosis Date     Arthritis      Asthma      Gastro-oesophageal reflux disease        Past Surgical History   Past Surgical History:   Procedure Laterality Date     ARTHROPLASTY KNEE BILATERAL  10/8/2012    Procedure: ARTHROPLASTY KNEE BILATERAL;  Bilateral Total Knee Arthroplasty;  Surgeon: Nino Andre MD;  Location: [facility] OR     HYSTERECTOMY         Prior to Admission Medications   Prior to Admission Medications   Prescriptions Last Dose Informant Patient Reported? Taking?   COMPRESSION STOCKINGS   No No   Si each continuous.   COMPRESSION STOCKINGS   No No   Si each continuous.   OMEPRAZOLE PO 2023 at am  Yes Yes   Sig: Take 40 mg by mouth daily   VITAMIN D, CHOLECALCIFEROL, PO 2023 at am  Yes Yes   Sig: Take 2,000 Units by mouth daily   albuterol (PROVENTIL) (2.5 MG/3ML) 0.083% neb solution prn  Yes Yes   Sig: Take 2.5 mg  by nebulization every 4 hours as needed for shortness of breath, wheezing or cough   cephALEXin (KEFLEX) 500 MG capsule 2/11/2023 at x3  Yes Yes   Sig: Take 500 mg by mouth 3 times daily   cetirizine (ZYRTEC) 10 MG tablet 2/11/2023 at am  Yes Yes   Sig: Take 10 mg by mouth daily as needed.     fluticasone (FLONASE) 50 MCG/ACT nasal spray Past Month  Yes Yes   Sig: Spray 1 spray into both nostrils daily as needed for rhinitis or allergies   fluticasone-vilanterol (BREO ELLIPTA) 200-25 MCG/ACT inhaler 2/11/2023 at am - family can bring in  Yes Yes   Sig: Inhale 1 puff into the lungs daily   ipratropium - albuterol 0.5 mg/2.5 mg/3 mL (DUONEB) 0.5-2.5 (3) MG/3ML neb solution Past Month  Yes Yes   Sig: Take 1 vial by nebulization every 6 hours as needed for shortness of breath, wheezing or cough   metroNIDAZOLE (METROCREAM) 0.75 % external cream 2/11/2023 at pm  Yes Yes   Sig: Apply topically 2 times daily   mometasone (ELOCON) 0.1 % external cream 2/11/2023 at am  Yes Yes   Sig: Apply topically daily   montelukast (SINGULAIR) 10 MG tablet 2/11/2023 at am  Yes Yes   Sig: Take 10 mg by mouth daily   multivitamin, therapeutic (THERA-VIT) TABS tablet 2/11/2023 at am  Yes Yes   Sig: Take 1 tablet by mouth daily   predniSONE (DELTASONE) 20 MG tablet More than a month  Yes Yes   Sig: Take 20 mg by mouth as needed As directed   sertraline (ZOLOFT) 100 MG tablet 2/11/2023  Yes Yes   Sig: Take 100 mg by mouth daily      Facility-Administered Medications: None        Review of Systems    CONSTITUTIONAL: Positive for fever, no chills, change in weight  INTEGUMENTARY/SKIN: Positive for rash   ENT/MOUTH: NEGATIVE for ear, mouth and throat problems  RESP: NEGATIVE for significant cough or SOB  CV: NEGATIVE for chest pain, palpitations or peripheral edema  ROS otherwise negative     Physical Exam   Vital Signs: Temp: 98.7  F (37.1  C) Temp src: Oral BP: 133/70 Pulse: 71   Resp: 20 SpO2: 94 % O2 Device: None (Room air)    Weight: 247  lbs 11.2 oz  General Appearance: Well-appearing, well-nourished, no acute distress   Head:  Normocephalic  Eyes:  conjunctiva/corneas clear  ENT:  membranes are moist without pallor  Cardio:  Regular rate and rhythm  Pulm:  No respiratory distress  Back: No CVA tenderness, normal ROM  Abdomen:  Obese, soft, non-tender, non distended,no rebound or guarding.  Extremities: Normal gait  Skin: Large area of erythema( 25X15 cm) on the left upper abdominal wall with induration and a small pustule, tender to touch and warm   Neuro:  Alert and oriented ×3, moving all extremities, no gross sensory defects       Other:  Photograph      02/11/2023 9:41 PM   Attached To:            Medical Decision Making       **CLEAR ALL SELECTIONS**      Data     I have personally reviewed the following data over the past 24 hrs:    17.1 (H)  \   12.5   / 202     140 109 (H) 16 /  132 (H)   3.4 (L) 20 (L) 0.65 \       ALT: 23 AST: 22 AP: 92 TBILI: 0.6   ALB: 3.2 (L) TOT PROTEIN: 6.4 LIPASE: N/A       Procal: N/A CRP: 15.3 (H) Lactic Acid: 0.7         Imaging results reviewed over the past 24 hrs:   Recent Results (from the past 24 hour(s))   CT Abdomen Pelvis w Contrast    Narrative    EXAM: CT ABDOMEN PELVIS W CONTRAST  LOCATION: [facility]  DATE/TIME: 2/11/2023 11:47 PM    INDICATION: Abdominal wall cellulitis; evaluate for intraperitoneal involvement.  COMPARISON: CT chest 04/09/2017.  TECHNIQUE: CT scan of the abdomen and pelvis was performed following injection of IV contrast. Multiplanar reformats were obtained. Dose reduction techniques were used.  CONTRAST: 90 mL Isovue 370.    FINDINGS:    LOWER CHEST: Mild peripheral pulmonary fibrosis. Cardiomegaly.    HEPATOBILIARY: Diffuse hepatic steatosis.    Gallbladder is normal.    No intrahepatic or intrahepatic biliary ductal dilatation.    PANCREAS: Enhances normally. No peripancreatic inflammatory fat stranding.    SPLEEN: Enhances normally. Normal  size.    ADRENAL GLANDS: Normal.    KIDNEYS: Both kidneys enhance symmetrically, without hydronephrosis.    No nephroureterolithiasis.    Urinary bladder is unremarkable.    PELVIC ORGANS: Hysterectomy.    BOWEL: No evidence of acute gastrointestinal inflammation or obstruction. Tiny hiatal hernia. Colonic diverticulosis. Normal appendix.    No intraperitoneal free fluid or free air. Small fat-containing umbilical hernia.    LYMPH NODES: No suspicious abdominal or pelvic lymphadenopathy.    VASCULATURE: No abdominal aortic aneurysm. Mild atheromatous disease.    MUSCULOSKELETAL: No suspicious abnormality. Mixed lucent/sclerotic lesion of the anterior right intertrochanteric femur, axial image 243, likely benign.    OTHER: Cutaneous thickening and subcutaneous induration of the upper anterior left abdominal wall. Subcutaneous induration extends deep to abut the superficial fascia of the left rectus and the left external oblique musculature. No soft tissue gas to   provide CT evidence of a necrotizing infection.      Impression    IMPRESSION:   1.  Anterior left abdominal wall cellulitis, extending deep to abut the superficial fascia of the left abdominal wall musculature, which could reflect a superimposed superficial fasciitis. No soft tissue gas to suggest convincing CT evidence of a   necrotizing infection. This does not exclude a clinical diagnosis of necrotizing infection.  2.  No CT evidence of acute intraperitoneal process.

## 2023-02-12 NOTE — PLAN OF CARE
Problem: Plan of Care - These are the overarching goals to be used throughout the patient stay.    Goal: Optimal Comfort and Wellbeing  Outcome: Progressing     Problem: Infection  Goal: Absence of Infection Signs and Symptoms  Outcome: Progressing   Goal Outcome Evaluation:    Patient A&Ox4, VSS, and afebrile. Patient reported abdominal pain and as given PRN Tylenol. K protocol and recheck tomorrow. Cultures pending. Abdominal redness is marked and stayed within the marking today. SBA and regular diet. Pt to transfer to  when room is ready. Pending discharge home.

## 2023-02-12 NOTE — ED TRIAGE NOTES
Pt presents with rash across L abd starting on Tuesday. Pt reports fevers associated with rash. Drainage noted. Recently started on abx. ABCs intact.      Triage Assessment     Row Name 02/11/23 2133       Triage Assessment (Adult)    Airway WDL WDL       Respiratory WDL    Respiratory WDL WDL       Skin Circulation/Temperature WDL    Skin Circulation/Temperature WDL X  Rash on L abd       Cardiac WDL    Cardiac WDL WDL       Peripheral/Neurovascular WDL    Peripheral Neurovascular WDL WDL       Cognitive/Neuro/Behavioral WDL    Cognitive/Neuro/Behavioral WDL WDL

## 2023-02-12 NOTE — PLAN OF CARE
Problem: Skin or Soft Tissue Infection  Goal: Absence of Infection Signs and Symptoms  Outcome: Progressing     Problem: Pain Acute  Goal: Optimal Pain Control and Function  Outcome: Progressing  Intervention: Develop Pain Management Plan  Intervention: Prevent or Manage Pain   Goal Outcome Evaluation:    Pt arrived to unit around 0121, settled by staff.  VSS, reported pain to abdomen 7/10, initially declined intervention, however pt then requested something for the pain as it wasn't allowing for her to sleep comfortably.  PRN tylenol given with some effectiveness.  IV abx given x2.  SBA to bathroom, calls appropriately.  Pt has own CPAP from home that she used during the night.  Abdomen is reddened, noted the redness extending outside of the marked boarders and a small open site with a small amount of purulent drainage.  K protocol, recheck this AM.

## 2023-02-12 NOTE — PROGRESS NOTES
[facility]    Progress Note - Hospitalist Service       Date of Admission:  2/11/2023    Assessment & Plan   Bernice Umaña is a 67 year old female admitted on 2/11/2023. She has a history of asthma, anxiety, depression, bilateral knee replacement and GERD and is admitted for cellulitis.    Abdominal wall cellulitis  Fever  Leukocytosis  Presented with erythema 25 x 15 cm with purulent discharge, tender to touch and warm on the abdomen.    Febrile to102.5, labs notable for leukocytosis, 17.1 and CRP of 15.3.  Abdominal CT demonstrated Anterior left abdominal wall cellulitis, extending deep to abut the superficial fascia of the left abdominal wall musculature, which could reflect a superimposed superficial fasciitis, no soft tissue gas to suggest convincing CT evidence of a   necrotizing infection, no CT evidence of acute intraperitoneal process.  Given the exam finding, positive lab work and the CT finding, all these finding consistent with cellulitis but not erysipelas also no fluctuation which will exclude skin abscess.  -Prelim wound culture with no organisms  -Follow-up blood culture  -Tylenol for fever and pain  -Continue Zosyn  -Continue Vanco, pharmacy to dose  -Follow up AM CRP     Hypokalemia  Potassium 3.4 on admission.  -Potassium replacement protocol     Chronic conditions  Asthma  -Continue PTA Singulair  -Continue PTA Breo Ellipta  -Continue PTA albuterol as needed  Depression  -Continue PTA Zoloft  GERD.    -Continue Protonix 40 mg twice daily substitute for PTA omeprazole     Diet: Regular Diet Adult    DVT Prophylaxis: Enoxaparin (Lovenox) SQ  Salvador Catheter: Not present  Fluids: P.o.  Lines: None     Cardiac Monitoring: None  Code Status: Full Code      Clinically Significant Risk Factors Present on Admission              # Hypoalbuminemia: Lowest albumin = 3.2 g/dL at 2/11/2023 10:30 PM, will monitor as appropriate          # Obesity: Estimated body mass index is 39.98  "kg/m  as calculated from the following:    Height as of this encounter: 1.676 m (5' 6\").    Weight as of this encounter: 112.4 kg (247 lb 11.2 oz).           Disposition Plan      Expected Discharge Date: 02/13/2023                The patient's care was discussed with the Attending Physician, Dr. Howard.    Lisa Flores MD  Hospitalist Service  [facility]  Securely message with [employer] (more info)  Text page via [employer] Paging/Directory   ______________________________________________________________________    Interval History   Admitted overnight.    This morning patient states that the abdominal pain is present with movement otherwise denies any pain at rest.  Has not had any drainage from the abdominal rash.  Denies any fevers or chills.  Denies any nausea vomiting.  Has not had a bowel movement.    Physical Exam   Vital Signs: Temp: 98.8  F (37.1  C) Temp src: Oral BP: 114/63 Pulse: 70   Resp: 20 SpO2: 94 % O2 Device: None (Room air)    Weight: 247 lbs 11.2 oz    Constitutional: awake, alert, cooperative, no apparent distress, and appears stated age  Respiratory: No increased work of breathing, good air exchange, clear to auscultation bilaterally, no crackles or wheezing  Cardiovascular: Normal apical impulse, regular rate and rhythm, normal S1 and S2, no S3 or S4, and no murmur noted  GI: No scars, normal bowel sounds, soft, non-distended, non-tender, no masses palpated  Skin: extensive abdominal erythematous rash, increased from admission, no drainage or fluctuance  Neurologic: Awake, alert, oriented to name, place and time.   Neuropsychiatric: General: normal, calm and normal eye contact              Medical Decision Making       Please see A&P for additional details of medical decision making.      Data     I have personally reviewed the following data over the past 24 hrs:    14.1 (H)  \   12.6   / 206     142 111 (H) 12 / 99   4.0 23 0.64 \       ALT: 23 AST: 22 AP: 92 TBILI: 0.6 "   ALB: 3.2 (L) TOT PROTEIN: 6.4 LIPASE: N/A       Procal: N/A CRP: 15.3 (H) Lactic Acid: 0.7         Imaging results reviewed over the past 24 hrs:   Recent Results (from the past 24 hour(s))   CT Abdomen Pelvis w Contrast    Narrative    EXAM: CT ABDOMEN PELVIS W CONTRAST  LOCATION: [facility]  DATE/TIME: 2/11/2023 11:47 PM    INDICATION: Abdominal wall cellulitis; evaluate for intraperitoneal involvement.  COMPARISON: CT chest 04/09/2017.  TECHNIQUE: CT scan of the abdomen and pelvis was performed following injection of IV contrast. Multiplanar reformats were obtained. Dose reduction techniques were used.  CONTRAST: 90 mL Isovue 370.    FINDINGS:    LOWER CHEST: Mild peripheral pulmonary fibrosis. Cardiomegaly.    HEPATOBILIARY: Diffuse hepatic steatosis.    Gallbladder is normal.    No intrahepatic or intrahepatic biliary ductal dilatation.    PANCREAS: Enhances normally. No peripancreatic inflammatory fat stranding.    SPLEEN: Enhances normally. Normal size.    ADRENAL GLANDS: Normal.    KIDNEYS: Both kidneys enhance symmetrically, without hydronephrosis.    No nephroureterolithiasis.    Urinary bladder is unremarkable.    PELVIC ORGANS: Hysterectomy.    BOWEL: No evidence of acute gastrointestinal inflammation or obstruction. Tiny hiatal hernia. Colonic diverticulosis. Normal appendix.    No intraperitoneal free fluid or free air. Small fat-containing umbilical hernia.    LYMPH NODES: No suspicious abdominal or pelvic lymphadenopathy.    VASCULATURE: No abdominal aortic aneurysm. Mild atheromatous disease.    MUSCULOSKELETAL: No suspicious abnormality. Mixed lucent/sclerotic lesion of the anterior right intertrochanteric femur, axial image 243, likely benign.    OTHER: Cutaneous thickening and subcutaneous induration of the upper anterior left abdominal wall. Subcutaneous induration extends deep to abut the superficial fascia of the left rectus and the left external oblique  musculature. No soft tissue gas to   provide CT evidence of a necrotizing infection.      Impression    IMPRESSION:   1.  Anterior left abdominal wall cellulitis, extending deep to abut the superficial fascia of the left abdominal wall musculature, which could reflect a superimposed superficial fasciitis. No soft tissue gas to suggest convincing CT evidence of a   necrotizing infection. This does not exclude a clinical diagnosis of necrotizing infection.  2.  No CT evidence of acute intraperitoneal process.

## 2023-02-13 LAB
ANION GAP SERPL CALCULATED.3IONS-SCNC: 9 MMOL/L (ref 5–18)
BUN SERPL-MCNC: 9 MG/DL (ref 8–22)
C REACTIVE PROTEIN LHE: 13.9 MG/DL (ref 0–?)
CALCIUM SERPL-MCNC: 8.8 MG/DL (ref 8.5–10.5)
CHLORIDE BLD-SCNC: 112 MMOL/L (ref 98–107)
CO2 SERPL-SCNC: 24 MMOL/L (ref 22–31)
CREAT SERPL-MCNC: 0.65 MG/DL (ref 0.6–1.1)
CREAT SERPL-MCNC: 0.65 MG/DL (ref 0.6–1.1)
ERYTHROCYTE [DISTWIDTH] IN BLOOD BY AUTOMATED COUNT: 13.7 % (ref 10–15)
GFR SERPL CREATININE-BSD FRML MDRD: >90 ML/MIN/1.73M2
GFR SERPL CREATININE-BSD FRML MDRD: >90 ML/MIN/1.73M2
GLUCOSE BLD-MCNC: 91 MG/DL (ref 70–125)
HCT VFR BLD AUTO: 37.7 % (ref 35–47)
HGB BLD-MCNC: 12 G/DL (ref 11.7–15.7)
MCH RBC QN AUTO: 31.3 PG (ref 26.5–33)
MCHC RBC AUTO-ENTMCNC: 31.8 G/DL (ref 31.5–36.5)
MCV RBC AUTO: 98 FL (ref 78–100)
PLATELET # BLD AUTO: 217 10E3/UL (ref 150–450)
POTASSIUM BLD-SCNC: 3.9 MMOL/L (ref 3.5–5)
RBC # BLD AUTO: 3.84 10E6/UL (ref 3.8–5.2)
SODIUM SERPL-SCNC: 145 MMOL/L (ref 136–145)
WBC # BLD AUTO: 8.9 10E3/UL (ref 4–11)

## 2023-02-13 PROCEDURE — 86140 C-REACTIVE PROTEIN: CPT | Performed by: STUDENT IN AN ORGANIZED HEALTH CARE EDUCATION/TRAINING PROGRAM

## 2023-02-13 PROCEDURE — 85027 COMPLETE CBC AUTOMATED: CPT | Performed by: STUDENT IN AN ORGANIZED HEALTH CARE EDUCATION/TRAINING PROGRAM

## 2023-02-13 PROCEDURE — 258N000003 HC RX IP 258 OP 636: Performed by: STUDENT IN AN ORGANIZED HEALTH CARE EDUCATION/TRAINING PROGRAM

## 2023-02-13 PROCEDURE — 80048 BASIC METABOLIC PNL TOTAL CA: CPT | Performed by: STUDENT IN AN ORGANIZED HEALTH CARE EDUCATION/TRAINING PROGRAM

## 2023-02-13 PROCEDURE — 120N000001 HC R&B MED SURG/OB

## 2023-02-13 PROCEDURE — 250N000011 HC RX IP 250 OP 636: Performed by: STUDENT IN AN ORGANIZED HEALTH CARE EDUCATION/TRAINING PROGRAM

## 2023-02-13 PROCEDURE — 99233 SBSQ HOSP IP/OBS HIGH 50: CPT | Mod: GC | Performed by: STUDENT IN AN ORGANIZED HEALTH CARE EDUCATION/TRAINING PROGRAM

## 2023-02-13 PROCEDURE — 36415 COLL VENOUS BLD VENIPUNCTURE: CPT | Performed by: STUDENT IN AN ORGANIZED HEALTH CARE EDUCATION/TRAINING PROGRAM

## 2023-02-13 PROCEDURE — 250N000013 HC RX MED GY IP 250 OP 250 PS 637: Performed by: STUDENT IN AN ORGANIZED HEALTH CARE EDUCATION/TRAINING PROGRAM

## 2023-02-13 RX ADMIN — PIPERACILLIN AND TAZOBACTAM 3.38 G: 3; .375 INJECTION, POWDER, FOR SOLUTION INTRAVENOUS at 04:37

## 2023-02-13 RX ADMIN — VANCOMYCIN HYDROCHLORIDE 1750 MG: 5 INJECTION, POWDER, LYOPHILIZED, FOR SOLUTION INTRAVENOUS at 12:23

## 2023-02-13 RX ADMIN — SERTRALINE 100 MG: 50 TABLET, FILM COATED ORAL at 07:44

## 2023-02-13 RX ADMIN — PANTOPRAZOLE SODIUM 40 MG: 40 TABLET, DELAYED RELEASE ORAL at 19:44

## 2023-02-13 RX ADMIN — ACETAMINOPHEN 650 MG: 325 TABLET ORAL at 14:45

## 2023-02-13 RX ADMIN — PANTOPRAZOLE SODIUM 40 MG: 40 TABLET, DELAYED RELEASE ORAL at 07:44

## 2023-02-13 RX ADMIN — FLUTICASONE FUROATE AND VILANTEROL 1 PUFF: 200; 25 POWDER RESPIRATORY (INHALATION) at 07:44

## 2023-02-13 RX ADMIN — ENOXAPARIN SODIUM 40 MG: 100 INJECTION SUBCUTANEOUS at 04:37

## 2023-02-13 RX ADMIN — MONTELUKAST 10 MG: 10 TABLET, FILM COATED ORAL at 07:44

## 2023-02-13 ASSESSMENT — ACTIVITIES OF DAILY LIVING (ADL)
ADLS_ACUITY_SCORE: 20

## 2023-02-13 NOTE — PLAN OF CARE
Goal Outcome Evaluation:      Problem: Plan of Care - These are the overarching goals to be used throughout the patient stay.    Goal: Optimal Comfort and Wellbeing  Outcome: Progressing     Problem: Infection  Goal: Absence of Infection Signs and Symptoms  Outcome: Progressing     Problem: Skin or Soft Tissue Infection  Goal: Absence of Infection Signs and Symptoms  Outcome: Progressing   Patient is A&Ox4. VSS. Denies pain. Abdomen is tender to LUQ. Redness and scab to LUQ.  IV Zosyn infusing in PIV. K protocol for recheck this AM. Tolerating Regular diet. Independent in room/SBA. Calls appropriately.

## 2023-02-13 NOTE — PLAN OF CARE
Problem: Pain Acute  Goal: Optimal Pain Control and Function  Intervention: Prevent or Manage Pain  Recent Flowsheet Documentation  Taken 2/13/2023 1501 by Glendy Hardin RN  Medication Review/Management: medications reviewed  Taken 2/13/2023 0745 by Glendy Hardin RN  Medication Review/Management: medications reviewed     Problem: Plan of Care - These are the overarching goals to be used throughout the patient stay.    Goal: Absence of Hospital-Acquired Illness or Injury  Intervention: Identify and Manage Fall Risk  Recent Flowsheet Documentation  Taken 2/13/2023 1501 by Glendy Hardin RN  Safety Promotion/Fall Prevention:   activity supervised   assistive device/personal items within reach   clutter free environment maintained   lighting adjusted   nonskid shoes/slippers when out of bed   patient and family education  Taken 2/13/2023 0745 by Glendy Hardin RN  Safety Promotion/Fall Prevention:   activity supervised   assistive device/personal items within reach   clutter free environment maintained   lighting adjusted   nonskid shoes/slippers when out of bed   patient and family education   Goal Outcome Evaluation:      Patient up to walk hallways this afternoon. Vital signs stable. Still reporting mild abdominal pain where cellulitis is. States that pain and redness has improved since admission. Continuing to monitor.

## 2023-02-13 NOTE — PROGRESS NOTES
[facility]    Progress Note - Hospitalist Service       Date of Admission:  2/11/2023    Assessment & Plan   Bernice Umaña is a 67 year old female admitted on 2/11/2023. She has a history of asthma, anxiety, depression, bilateral knee replacement and GERD and is admitted for cellulitis.    Abdominal wall cellulitis  Fever  Leukocytosis  Presented with erythema 25 x 15 cm with purulent discharge, tender to touch and warm on the abdomen.    Febrile to102.5, labs notable for leukocytosis, 17.1 and CRP of 15.3.  Abdominal CT demonstrated Anterior left abdominal wall cellulitis, extending deep to abut the superficial fascia of the left abdominal wall musculature, which could reflect a superimposed superficial fasciitis, no soft tissue gas to suggest convincing CT evidence of a   necrotizing infection, no CT evidence of acute intraperitoneal process.  Given the exam finding, positive lab work and the CT finding, all these finding consistent with cellulitis but not erysipelas also no fluctuation which will exclude skin abscess.  -Wound culture growing staph aureus, awaiting sensitivities.   -Blood culture  NGTD  - Vancomycin (2/11- ), pharmacy to dose  - Discontinued Zosyn 2/11-2/13)  -Tylenol for fever and pain     Hypokalemia  Potassium 3.4 on admission.  -Potassium replacement protocol     Chronic conditions  Asthma  -Continue PTA Singulair  -Continue PTA Breo Ellipta  -Continue PTA albuterol as needed  Depression  -Continue PTA Zoloft  GERD.    -Continue Protonix 40 mg twice daily substitute for PTA omeprazole     Diet: Regular Diet Adult    DVT Prophylaxis: Enoxaparin (Lovenox) SQ  Salvador Catheter: Not present  Fluids: P.o.  Lines: None     Cardiac Monitoring: None  Code Status: Full Code      Clinically Significant Risk Factors              # Hypoalbuminemia: Lowest albumin = 3.2 g/dL at 2/11/2023 10:30 PM, will monitor as appropriate            # Obesity: Estimated body mass index is  "39.98 kg/m  as calculated from the following:    Height as of this encounter: 1.676 m (5' 6\").    Weight as of this encounter: 112.4 kg (247 lb 11.2 oz)., PRESENT ON ADMISSION         Disposition Plan     Expected Discharge Date: 02/13/2023                The patient's care was discussed with the Attending Physician, Dr. Howard.    Lisa Flores MD  Hospitalist Service  [facility]  Securely message with [employer] (more info)  Text page via [employer] Paging/Directory   ______________________________________________________________________    Interval History   Admitted overnight.    This morning patient states that the abdominal pain is present with movement otherwise denies any pain at rest.  Has not had any drainage from the abdominal rash.  Denies any fevers or chills.  Denies any nausea vomiting.  Has not had a bowel movement.    Physical Exam   Vital Signs: Temp: 98.5  F (36.9  C) Temp src: Oral BP: 139/72 Pulse: 61   Resp: 20 SpO2: 94 % O2 Device: None (Room air)    Weight: 247 lbs 11.2 oz    Constitutional: awake, alert, cooperative, no apparent distress, and appears stated age  Respiratory: No increased work of breathing, good air exchange, clear to auscultation bilaterally, no crackles or wheezing  Cardiovascular: Normal apical impulse, regular rate and rhythm, normal S1 and S2, no S3 or S4, and no murmur noted  GI: No scars, normal bowel sounds, soft, non-distended, non-tender, no masses palpated  Skin: abdominal erythematous rash,  Decreased redness from yesterday, no drainage   Neurologic: Awake, alert, oriented to name, place and time.   Neuropsychiatric: General: normal, calm and normal eye contact                    Medical Decision Making       Please see A&P for additional details of medical decision making.      Data     I have personally reviewed the following data over the past 24 hrs:    8.9  \   12.0   / 217     145 112 (H) 9 / 91   3.9 24 0.65; 0.65 \       Procal: N/A CRP: " 13.9 (H) Lactic Acid: N/A         Imaging results reviewed over the past 24 hrs:   No results found for this or any previous visit (from the past 24 hour(s)).

## 2023-02-13 NOTE — PROGRESS NOTES
Care Management Follow Up    Length of Stay (days): 2    Expected Discharge Date: 02/14/2023     Concerns to be Addressed:       Patient plan of care discussed at interdisciplinary rounds: Yes    Anticipated Discharge Disposition:  Home    Additional Information:  Chart reviewed, currently no needs noted for CM. CM to monitor wound cultures if needs change.       SUJATA Hess

## 2023-02-13 NOTE — PLAN OF CARE
Problem: Plan of Care - These are the overarching goals to be used throughout the patient stay.    Goal: Optimal Comfort and Wellbeing  Outcome: Progressing  Intervention: Monitor Pain and Promote Comfort  Recent Flowsheet Documentation  Taken 2/13/2023 0042 by Kanwal Saab RN  Pain Management Interventions: care clustered  Taken 2/12/2023 2337 by Kanwal Saab RN  Pain Management Interventions:   medication (see MAR)   care clustered   emotional support   pain management plan reviewed with patient/caregiver   quiet environment facilitated   repositioned   rest   therapeutic presence  Taken 2/12/2023 2000 by Kanwal Saab RN  Pain Management Interventions:   care clustered   emotional support   pain management plan reviewed with patient/caregiver   quiet environment facilitated   repositioned   rest   therapeutic presence     Patient's vitals are stable, afebrile. Tylenol administered for abdominal pain with relief (see MAR). Abdomen reddened, warm to touch. Pt. Up independently, voiding. Will continue to monitor.     NICOLASA Saab RN

## 2023-02-14 VITALS
SYSTOLIC BLOOD PRESSURE: 156 MMHG | RESPIRATION RATE: 20 BRPM | HEART RATE: 85 BPM | OXYGEN SATURATION: 94 % | BODY MASS INDEX: 39.81 KG/M2 | WEIGHT: 247.7 LBS | TEMPERATURE: 98 F | HEIGHT: 66 IN | DIASTOLIC BLOOD PRESSURE: 72 MMHG

## 2023-02-14 PROBLEM — B95.61 CELLULITIS DUE TO STAPHYLOCOCCUS AUREUS: Status: ACTIVE | Noted: 2023-02-14

## 2023-02-14 PROBLEM — L03.90 CELLULITIS DUE TO STAPHYLOCOCCUS AUREUS: Status: ACTIVE | Noted: 2023-02-14

## 2023-02-14 LAB
HOLD SPECIMEN: NORMAL
HOLD SPECIMEN: NORMAL
POTASSIUM BLD-SCNC: 3.6 MMOL/L (ref 3.5–5)

## 2023-02-14 PROCEDURE — 250N000013 HC RX MED GY IP 250 OP 250 PS 637: Performed by: STUDENT IN AN ORGANIZED HEALTH CARE EDUCATION/TRAINING PROGRAM

## 2023-02-14 PROCEDURE — 99238 HOSP IP/OBS DSCHRG MGMT 30/<: CPT | Mod: GC | Performed by: STUDENT IN AN ORGANIZED HEALTH CARE EDUCATION/TRAINING PROGRAM

## 2023-02-14 PROCEDURE — 36415 COLL VENOUS BLD VENIPUNCTURE: CPT | Performed by: FAMILY MEDICINE

## 2023-02-14 PROCEDURE — 250N000011 HC RX IP 250 OP 636: Performed by: STUDENT IN AN ORGANIZED HEALTH CARE EDUCATION/TRAINING PROGRAM

## 2023-02-14 PROCEDURE — 84132 ASSAY OF SERUM POTASSIUM: CPT | Performed by: FAMILY MEDICINE

## 2023-02-14 PROCEDURE — 258N000003 HC RX IP 258 OP 636: Performed by: STUDENT IN AN ORGANIZED HEALTH CARE EDUCATION/TRAINING PROGRAM

## 2023-02-14 RX ORDER — DOXYCYCLINE 100 MG/1
100 CAPSULE ORAL EVERY 12 HOURS
Qty: 14 CAPSULE | Refills: 0 | Status: SHIPPED | OUTPATIENT
Start: 2023-02-14 | End: 2023-02-21

## 2023-02-14 RX ORDER — DOXYCYCLINE 100 MG/1
100 CAPSULE ORAL EVERY 12 HOURS SCHEDULED
Status: DISCONTINUED | OUTPATIENT
Start: 2023-02-14 | End: 2023-02-14 | Stop reason: HOSPADM

## 2023-02-14 RX ORDER — DOXYCYCLINE 100 MG/1
100 CAPSULE ORAL EVERY 12 HOURS
Qty: 14 CAPSULE | Refills: 0 | Status: SHIPPED | OUTPATIENT
Start: 2023-02-14 | End: 2023-02-14

## 2023-02-14 RX ADMIN — SERTRALINE 100 MG: 50 TABLET, FILM COATED ORAL at 07:57

## 2023-02-14 RX ADMIN — VANCOMYCIN HYDROCHLORIDE 1750 MG: 5 INJECTION, POWDER, LYOPHILIZED, FOR SOLUTION INTRAVENOUS at 06:05

## 2023-02-14 RX ADMIN — FLUTICASONE FUROATE AND VILANTEROL 1 PUFF: 200; 25 POWDER RESPIRATORY (INHALATION) at 07:58

## 2023-02-14 RX ADMIN — DOXYCYCLINE HYCLATE 100 MG: 100 CAPSULE ORAL at 11:40

## 2023-02-14 RX ADMIN — ENOXAPARIN SODIUM 40 MG: 100 INJECTION SUBCUTANEOUS at 06:05

## 2023-02-14 RX ADMIN — PANTOPRAZOLE SODIUM 40 MG: 40 TABLET, DELAYED RELEASE ORAL at 07:57

## 2023-02-14 RX ADMIN — MONTELUKAST 10 MG: 10 TABLET, FILM COATED ORAL at 07:57

## 2023-02-14 ASSESSMENT — ACTIVITIES OF DAILY LIVING (ADL)
ADLS_ACUITY_SCORE: 20

## 2023-02-14 NOTE — PLAN OF CARE
Goal Outcome Evaluation:         VSS. Patient denies pain. Abdomen red/warm, outlined. Small amount of drainage noted from abdomen. Cleansed and gauze in place. Warm washcloth to abdomen. Patient up independently in room and able to rest between cares.

## 2023-02-14 NOTE — DISCHARGE SUMMARY
[facility]  Discharge Summary - Medicine & Pediatrics       Date of Admission:  2/11/2023  Date of Discharge:  2/14/2023  Discharging Provider: Davis  Discharge Service: Hospitalist Service    Discharge Diagnoses   Principal Problem:  Staphylococcus Aureus Sepsis due to Cellulitis    (2/14/2023)  Active Problems:    Anxiety and depression (1/9/2017)    Hypokalemia (2/11/2023)    Cellulitis of abdominal wall (2/11/2023)    Fever, unspecified fever cause (2/11/2023)    Leukocytosis, unspecified type (2/11/2023)    Follow-ups Needed After Discharge   Follow-up Appointments     Follow-up and recommended labs and tests       Follow up with primary care provider, VIDA GIPSON, within 7 days for   hospital follow- up.             Unresulted Labs Ordered in the Past 30 Days of this Admission     Date and Time Order Name Status Description    2/11/2023  9:44 PM Wound Aerobic Bacterial Culture Routine with Gram Stain Preliminary     2/11/2023  9:44 PM Blood Culture Line, venous Preliminary     2/11/2023  9:44 PM Blood Culture Peripheral Blood Preliminary       These results will be followed up by primary team.     Discharge Disposition   Discharged to home  Condition at discharge: Stable    Hospital Course   Bernice Umaña is a 67 year old female admitted on 2/11/2023. She has a history of asthma, anxiety, depression, bilateral knee replacement and GERD and was admitted for abdominal wall cellulitis.     Staph aureus cellulitis  Patient initially developed left sided abdominal rash on Tuesday.  Patient reported fevers and drainage from the rash.  She was started on Keflex and topical metronidazole.  However patient had worsening of her rash and had some abdominal pain and continued fevers prompting her to come to the ED.  On presentation, patient was febrile to 102.5 otherwise other vitals were stable.  Labs were notable for leukocytosis of 17.1, elevated CRP of 15.3, and mild hypokalemia of 3.4.   Erythema 25 x 15 cm with purulent discharge, tender to touch and warm on the abdomen.  she had an erythematous rash on her left side of the abdomen measuring 25 x 15 cm with purulent discharge, warm and tender on palpation. Abdominal CT demonstrated Anterior left abdominal wall cellulitis, extending deep to abut the superficial fascia of the left abdominal wall musculature, which could reflect a superimposed superficial fasciitis, no soft tissue gas to suggest convincing CT evidence of a   necrotizing infection, no CT evidence of acute intraperitoneal process. Wound culture grew staph aureus, awaiting sensitivities on day of discharge.  Blood cultures were NGTD.  She was initially started on Vanco and Zosyn.  Zosyn was discontinued when speciation was obtained.  Vancomycin was transitioned to doxycycline on discharge.  Patient to complete 7 more days of antibiotics.     Hypokalemia  Potassium 3.4 on admission.  Repleted as needed.    Consultations This Hospital Stay   PHARMACY TO DOSE VANCO  PHARMACY TO DOSE VANCO  PHARMACY TO DOSE VANCO    Code Status   Full Code       The patient was discussed with Dr. Davis Flores MD  Teaching [address] [address]  Phone: [phone]  Fax: [phone]  ______________________________________________________________________    Physical Exam   Vital Signs: Temp: 98  F (36.7  C) Temp src: Oral BP: (!) 156/72 Pulse: 85   Resp: 20 SpO2: 94 % O2 Device: None (Room air)    Weight: 247 lbs 11.2 oz  Constitutional: awake, alert, cooperative, no apparent distress, and appears stated age  Respiratory: No increased work of breathing, good air exchange, clear to auscultation bilaterally, no crackles or wheezing  Cardiovascular: Normal apical impulse, regular rate and rhythm, normal S1 and S2, no S3 or S4, and no murmur noted  GI: No scars, normal bowel sounds, soft, non-distended, non-tender, no  masses palpated  Skin: abdominal erythematous rash,  erythema markedly reduced from admission  Neurologic: Awake, alert, oriented to name, place and time.   Neuropsychiatric: General: normal, calm and normal eye contact         Primary Care Physician   VIDA GIPSON    Discharge Orders      Reason for your hospital stay    Abdominal wall cellulitis     Follow-up and recommended labs and tests     Follow up with primary care provider, VIDA GIPSON, within 7 days for hospital follow- up.     Activity    Your activity upon discharge: activity as tolerated     Diet    Follow this diet upon discharge: Orders Placed This Encounter      Regular Diet Adult       Significant Results and Procedures   Most Recent 3 CBC's:Recent Labs   Lab Test 02/13/23  0650 02/12/23  0810 02/11/23  2230   WBC 8.9 14.1* 17.1*   HGB 12.0 12.6 12.5   MCV 98 96 94    206 202     Most Recent 3 BMP's:Recent Labs   Lab Test 02/14/23  1046 02/13/23  0650 02/12/23  0810 02/11/23  2230   NA  --  145 142 140   POTASSIUM 3.6 3.9 4.0 3.4*   CHLORIDE  --  112* 111* 109*   CO2  --  24 23 20*   BUN  --  9 12 16   CR  --  0.65  0.65 0.64 0.65   ANIONGAP  --  9 8 11   PIETRO  --  8.8 8.9 8.9   GLC  --  91 99 132*     Most Recent 2 LFT's:Recent Labs   Lab Test 02/11/23  2230   AST 22   ALT 23   ALKPHOS 92   BILITOTAL 0.6   ,   Results for orders placed or performed during the hospital encounter of 02/11/23   CT Abdomen Pelvis w Contrast    Narrative    EXAM: CT ABDOMEN PELVIS W CONTRAST  LOCATION: [facility]  DATE/TIME: 2/11/2023 11:47 PM    INDICATION: Abdominal wall cellulitis; evaluate for intraperitoneal involvement.  COMPARISON: CT chest 04/09/2017.  TECHNIQUE: CT scan of the abdomen and pelvis was performed following injection of IV contrast. Multiplanar reformats were obtained. Dose reduction techniques were used.  CONTRAST: 90 mL Isovue 370.    FINDINGS:    LOWER CHEST: Mild peripheral pulmonary fibrosis.  Cardiomegaly.    HEPATOBILIARY: Diffuse hepatic steatosis.    Gallbladder is normal.    No intrahepatic or intrahepatic biliary ductal dilatation.    PANCREAS: Enhances normally. No peripancreatic inflammatory fat stranding.    SPLEEN: Enhances normally. Normal size.    ADRENAL GLANDS: Normal.    KIDNEYS: Both kidneys enhance symmetrically, without hydronephrosis.    No nephroureterolithiasis.    Urinary bladder is unremarkable.    PELVIC ORGANS: Hysterectomy.    BOWEL: No evidence of acute gastrointestinal inflammation or obstruction. Tiny hiatal hernia. Colonic diverticulosis. Normal appendix.    No intraperitoneal free fluid or free air. Small fat-containing umbilical hernia.    LYMPH NODES: No suspicious abdominal or pelvic lymphadenopathy.    VASCULATURE: No abdominal aortic aneurysm. Mild atheromatous disease.    MUSCULOSKELETAL: No suspicious abnormality. Mixed lucent/sclerotic lesion of the anterior right intertrochanteric femur, axial image 243, likely benign.    OTHER: Cutaneous thickening and subcutaneous induration of the upper anterior left abdominal wall. Subcutaneous induration extends deep to abut the superficial fascia of the left rectus and the left external oblique musculature. No soft tissue gas to   provide CT evidence of a necrotizing infection.      Impression    IMPRESSION:   1.  Anterior left abdominal wall cellulitis, extending deep to abut the superficial fascia of the left abdominal wall musculature, which could reflect a superimposed superficial fasciitis. No soft tissue gas to suggest convincing CT evidence of a   necrotizing infection. This does not exclude a clinical diagnosis of necrotizing infection.  2.  No CT evidence of acute intraperitoneal process.             Discharge Medications   Discharge Medication List as of 2/14/2023 11:02 AM      CONTINUE these medications which have CHANGED    Details   doxycycline hyclate (VIBRAMYCIN) 100 MG capsule Take 1 capsule (100 mg) by mouth  every 12 hours for 7 days, Disp-14 capsule, R-0, E-Prescribe         CONTINUE these medications which have NOT CHANGED    Details   albuterol (PROVENTIL) (2.5 MG/3ML) 0.083% neb solution Take 2.5 mg by nebulization every 4 hours as needed for shortness of breath, wheezing or cough, Historical      cetirizine (ZYRTEC) 10 MG tablet Take 10 mg by mouth daily as needed.  , Historical      fluticasone (FLONASE) 50 MCG/ACT nasal spray Spray 1 spray into both nostrils daily as needed for rhinitis or allergies, Historical      fluticasone-vilanterol (BREO ELLIPTA) 200-25 MCG/ACT inhaler Inhale 1 puff into the lungs daily, Historical      ipratropium - albuterol 0.5 mg/2.5 mg/3 mL (DUONEB) 0.5-2.5 (3) MG/3ML neb solution Take 1 vial by nebulization every 6 hours as needed for shortness of breath, wheezing or cough, Historical      mometasone (ELOCON) 0.1 % external cream Apply topically dailyHistorical      montelukast (SINGULAIR) 10 MG tablet Take 10 mg by mouth daily, Historical      multivitamin, therapeutic (THERA-VIT) TABS tablet Take 1 tablet by mouth daily, Historical      OMEPRAZOLE PO Take 40 mg by mouth daily, Historical      predniSONE (DELTASONE) 20 MG tablet Take 20 mg by mouth as needed As directed, Historical      sertraline (ZOLOFT) 100 MG tablet Take 100 mg by mouth daily, Historical      VITAMIN D, CHOLECALCIFEROL, PO Take 2,000 Units by mouth daily, Historical      !! COMPRESSION STOCKINGS 1 each continuous., 1 each, Does not apply, CONTINUOUS Starting 10/11/2012, Until Discontinued, Disp-1 each, R-0, Transitional      !! COMPRESSION STOCKINGS 1 each continuous., 1 each, Does not apply, CONTINUOUS Starting 10/10/2012, Until Discontinued, Disp-1 each, R-0, No Print Out       !! - Potential duplicate medications found. Please discuss with provider.      STOP taking these medications       cephALEXin (KEFLEX) 500 MG capsule Comments:   Reason for Stopping:         metroNIDAZOLE (METROCREAM) 0.75 % external  cream Comments:   Reason for Stopping:             Allergies   Allergies   Allergen Reactions     Sulfa Drugs Hives     Tramadol Nausea     Macrobid [Nitrofurantoin Anhydrous] Rash

## 2023-02-15 LAB
BACTERIA WND CULT: ABNORMAL
BACTERIA WND CULT: ABNORMAL
GRAM STAIN RESULT: ABNORMAL
GRAM STAIN RESULT: ABNORMAL

## 2023-02-16 ENCOUNTER — PATIENT OUTREACH (OUTPATIENT)
Dept: CARE COORDINATION | Facility: CLINIC | Age: 68
End: 2023-02-16
Payer: MEDICARE

## 2023-02-16 NOTE — PROGRESS NOTES
Clinic Care Coordination Contact  [facility]: Post-Discharge Note  SITUATION                                                      Admission:    Admission Date: 02/11/23   Reason for Admission: Abdominal wall cellulitis  Discharge:   Discharge Date: 02/14/23  Discharge Diagnosis: Abdominal wall cellulitis    BACKGROUND                                                      Per hospital discharge summary and inpatient provider notes:    Bernice Umaña is a 67 year old female who has a history of anxiety, depression, asthma, bilateral total knee replacement and GERD with and is admitted for fever and rash  Patient reports redness area does surround small pustule on the left upper abdominal wall.  The red area started small then increased in size over the last 4 days and associated with yellowish/greenish discharge.  Also endorses pain from the area, chills, fever of 102.5 at home and nausea but no vomiting.  Patient has virtual encounter and she was prescribed cephalexin which she received 3 doses without improvement.  Patient denies trauma to the area, insect bite, urinary symptoms, constipation or diarrhea.    ASSESSMENT           Discharge Assessment  How are you doing now that you are home?: I am feeling good. It is nice to be home. I still have some drainage going but I am doing better.  How are your symptoms? (Red Flag symptoms escalate to triage hotline per guidelines): Improved  Do you feel your condition is stable enough to be safe at home until your provider visit?: Yes  Does the patient have their discharge instructions? : Yes  Does the patient have questions regarding their discharge instructions? : No  Were you started on any new medications or were there changes to any of your previous medications? : Yes  Does the patient have all of their medications?: Yes  Do you have questions regarding any of your medications? : No  Discharge follow-up appointment scheduled within 14 calendar days? : Yes  Discharge  Follow Up Appointment Date: 02/16/23  Discharge Follow Up Appointment Scheduled with?: Primary Care Provider                  PLAN                                                      Outpatient Plan: Follow up with primary care provider, VIDA GIPSON, within 7 days for hospital follow- up.    No future appointments.      For any urgent concerns, please contact our 24 hour nurse triage line: [phone] ([phone])         DU Carr  [phone]  [facility]

## 2023-02-17 LAB
BACTERIA BLD CULT: NO GROWTH
BACTERIA BLD CULT: NO GROWTH

## 2023-04-02 ENCOUNTER — HEALTH MAINTENANCE LETTER (OUTPATIENT)
Age: 68
End: 2023-04-02

## 2023-06-24 ENCOUNTER — HEALTH MAINTENANCE LETTER (OUTPATIENT)
Age: 68
End: 2023-06-24

## 2024-08-17 ENCOUNTER — HEALTH MAINTENANCE LETTER (OUTPATIENT)
Age: 69
End: 2024-08-17

## 2024-10-14 ENCOUNTER — MEDICAL CORRESPONDENCE (OUTPATIENT)
Dept: HEALTH INFORMATION MANAGEMENT | Facility: CLINIC | Age: 69
End: 2024-10-14
Payer: COMMERCIAL

## 2024-10-14 ENCOUNTER — TRANSCRIBE ORDERS (OUTPATIENT)
Dept: OTHER | Age: 69
End: 2024-10-14

## 2024-10-14 DIAGNOSIS — N30.90 CYSTITIS: ICD-10-CM

## 2024-10-14 DIAGNOSIS — R10.2 PELVIC PAIN: Primary | ICD-10-CM

## 2024-10-28 ENCOUNTER — TRANSFERRED RECORDS (OUTPATIENT)
Dept: HEALTH INFORMATION MANAGEMENT | Facility: CLINIC | Age: 69
End: 2024-10-28
Payer: COMMERCIAL

## 2024-10-30 ENCOUNTER — TELEPHONE (OUTPATIENT)
Dept: RHEUMATOLOGY | Facility: CLINIC | Age: 69
End: 2024-10-30

## 2024-10-30 NOTE — TELEPHONE ENCOUNTER
Dr. Sheppard's RN called back for Arvind. [phone] ex [phone]

## 2024-10-30 NOTE — TELEPHONE ENCOUNTER
10/30- to get more details about the rheumatology referral. DX on referral isn't on protocol and the 11/1/2024 appointment will need to be cancelled if we don't get supporting records showing need for rheumatology visit.

## 2024-10-30 NOTE — TELEPHONE ENCOUNTER
Spoke with [facility] in [place] and they stated patient has cystitis that they feel is because of an autoimmune disorder. Patient has had elevated CRP in the past as well. Patient has been seen by [facility] as well for this and they also recommended Rheumatology. I contacted [facility] and they will be faxing records. Patient has Osteoarthritis of the hip as well.

## 2024-10-31 ENCOUNTER — TELEPHONE (OUTPATIENT)
Dept: RHEUMATOLOGY | Facility: CLINIC | Age: 69
End: 2024-10-31
Payer: COMMERCIAL

## 2024-11-01 ENCOUNTER — OFFICE VISIT (OUTPATIENT)
Dept: RHEUMATOLOGY | Facility: CLINIC | Age: 69
End: 2024-11-01
Payer: MEDICARE

## 2024-11-01 ENCOUNTER — LAB (OUTPATIENT)
Dept: LAB | Facility: CLINIC | Age: 69
End: 2024-11-01
Payer: COMMERCIAL

## 2024-11-01 VITALS
DIASTOLIC BLOOD PRESSURE: 88 MMHG | SYSTOLIC BLOOD PRESSURE: 146 MMHG | OXYGEN SATURATION: 96 % | HEART RATE: 84 BPM | WEIGHT: 247.9 LBS | HEIGHT: 66 IN | BODY MASS INDEX: 39.84 KG/M2

## 2024-11-01 DIAGNOSIS — N30.11 INTERSTITIAL CYSTITIS (CHRONIC) WITH HEMATURIA: Primary | ICD-10-CM

## 2024-11-01 DIAGNOSIS — Z71.89 ENCOUNTER TO DISCUSS TREATMENT OPTIONS: ICD-10-CM

## 2024-11-01 DIAGNOSIS — Z79.899 HIGH RISK MEDICATION USE: ICD-10-CM

## 2024-11-01 DIAGNOSIS — R82.81 PYURIA, STERILE: ICD-10-CM

## 2024-11-01 DIAGNOSIS — Z11.59 NEED FOR HEPATITIS C SCREENING TEST: ICD-10-CM

## 2024-11-01 DIAGNOSIS — Z11.1 TUBERCULOSIS SCREENING: ICD-10-CM

## 2024-11-01 DIAGNOSIS — N30.11 INTERSTITIAL CYSTITIS (CHRONIC) WITH HEMATURIA: ICD-10-CM

## 2024-11-01 DIAGNOSIS — Z11.59 NEED FOR HEPATITIS B SCREENING TEST: ICD-10-CM

## 2024-11-01 LAB
ALT SERPL W P-5'-P-CCNC: 24 U/L (ref 0–50)
BASOPHILS # BLD AUTO: 0.1 10E3/UL (ref 0–0.2)
BASOPHILS NFR BLD AUTO: 1 %
CREAT SERPL-MCNC: 0.91 MG/DL (ref 0.51–0.95)
EGFRCR SERPLBLD CKD-EPI 2021: 69 ML/MIN/1.73M2
EOSINOPHIL # BLD AUTO: 1.1 10E3/UL (ref 0–0.7)
EOSINOPHIL NFR BLD AUTO: 10 %
ERYTHROCYTE [DISTWIDTH] IN BLOOD BY AUTOMATED COUNT: 13.9 % (ref 10–15)
HBV SURFACE AB SERPL IA-ACNC: <3.5 M[IU]/ML
HBV SURFACE AB SERPL IA-ACNC: NONREACTIVE M[IU]/ML
HBV SURFACE AG SERPL QL IA: NONREACTIVE
HCT VFR BLD AUTO: 43.4 % (ref 35–47)
HCV AB SERPL QL IA: NONREACTIVE
HGB BLD-MCNC: 14 G/DL (ref 11.7–15.7)
IMM GRANULOCYTES # BLD: 0.1 10E3/UL
IMM GRANULOCYTES NFR BLD: 1 %
LYMPHOCYTES # BLD AUTO: 1.9 10E3/UL (ref 0.8–5.3)
LYMPHOCYTES NFR BLD AUTO: 18 %
MCH RBC QN AUTO: 30 PG (ref 26.5–33)
MCHC RBC AUTO-ENTMCNC: 32.3 G/DL (ref 31.5–36.5)
MCV RBC AUTO: 93 FL (ref 78–100)
MONOCYTES # BLD AUTO: 0.7 10E3/UL (ref 0–1.3)
MONOCYTES NFR BLD AUTO: 6 %
NEUTROPHILS # BLD AUTO: 7.1 10E3/UL (ref 1.6–8.3)
NEUTROPHILS NFR BLD AUTO: 65 %
NRBC # BLD AUTO: 0 10E3/UL
NRBC BLD AUTO-RTO: 0 /100
PLATELET # BLD AUTO: 258 10E3/UL (ref 150–450)
RBC # BLD AUTO: 4.66 10E6/UL (ref 3.8–5.2)
WBC # BLD AUTO: 10.9 10E3/UL (ref 4–11)

## 2024-11-01 PROCEDURE — 82565 ASSAY OF CREATININE: CPT

## 2024-11-01 PROCEDURE — 85025 COMPLETE CBC W/AUTO DIFF WBC: CPT

## 2024-11-01 PROCEDURE — 86481 TB AG RESPONSE T-CELL SUSP: CPT

## 2024-11-01 PROCEDURE — 86706 HEP B SURFACE ANTIBODY: CPT

## 2024-11-01 PROCEDURE — 87340 HEPATITIS B SURFACE AG IA: CPT

## 2024-11-01 PROCEDURE — 84460 ALANINE AMINO (ALT) (SGPT): CPT

## 2024-11-01 PROCEDURE — 86803 HEPATITIS C AB TEST: CPT

## 2024-11-01 PROCEDURE — 83516 IMMUNOASSAY NONANTIBODY: CPT

## 2024-11-01 PROCEDURE — 36415 COLL VENOUS BLD VENIPUNCTURE: CPT

## 2024-11-01 PROCEDURE — 99204 OFFICE O/P NEW MOD 45 MIN: CPT | Performed by: INTERNAL MEDICINE

## 2024-11-01 RX ORDER — HYDROXYZINE HYDROCHLORIDE 10 MG/1
10 TABLET, FILM COATED ORAL DAILY
COMMUNITY

## 2024-11-01 RX ORDER — CLOBETASOL PROPIONATE 0.05 MG/G
GEL TOPICAL DAILY
COMMUNITY

## 2024-11-01 RX ORDER — ROSUVASTATIN CALCIUM 5 MG/1
5 TABLET, COATED ORAL DAILY
COMMUNITY

## 2024-11-01 RX ORDER — METHOTREXATE 2.5 MG/1
2.5 TABLET ORAL
Qty: 16 TABLET | Refills: 0 | Status: SHIPPED | OUTPATIENT
Start: 2024-11-01 | End: 2024-11-05

## 2024-11-01 RX ORDER — VIBEGRON 75 MG/1
75 TABLET, FILM COATED ORAL DAILY
COMMUNITY

## 2024-11-01 RX ORDER — GABAPENTIN 400 MG/1
400 CAPSULE ORAL 2 TIMES DAILY
COMMUNITY

## 2024-11-01 RX ORDER — NAPROXEN SODIUM 220 MG/1
440 TABLET, FILM COATED ORAL 2 TIMES DAILY WITH MEALS
COMMUNITY

## 2024-11-01 RX ORDER — FOLIC ACID 1 MG/1
1 TABLET ORAL DAILY
Qty: 30 TABLET | Refills: 3 | Status: SHIPPED | OUTPATIENT
Start: 2024-11-01

## 2024-11-01 NOTE — PROGRESS NOTES
"  This document was created using a software with less than 100% fidelity, at times resulting in unintended, even erroneous syntax and grammar.  The reader is advised to keep this under consideration while reviewing, interpreting this note.      Rheumatology Consult Note      Bernice Umaña     YOB: 1955 Age: 68 year old    Date of visit: 11/01/24    PCP: Hue Sheppard    Chief Complaint     Interstitial cystitis (unknown etiology)/sterile pyuria    Assessment and Plan     The patient has an 8-month history of recurrent, quite debilitating dysuria and bladder spasms due to cystitis of unknown etiology.  Multiple courses of antibiotics failed to resolve her symptoms, bladder biopsies were reportedly normal per patient, urinalysis shows increased WBCs, some RBCs but without definite organism to account for infection.  She has had multiple courses of steroids through her urologist, steroids do help her urinary symptoms but the concern for long-term side effects of empirical steroid therapy, especially given her prediabetes is a concern.    Her bladder symptoms/cystitis does not fit discrete rheumatologic disorder i.e. lupus, vasculitis etc. she does not have any joint inflammation, palpable purpura or other signs of an autoimmune rheumatologic disorder.  We will check some additional serologies for vasculitis including CO-3 antibody and MPO antibody to round out her rheumatologic workup.  QuantiFERON also ordered to screen for TB given sterile pyuria    Given the response of her symptoms to steroids, we did discuss using DMARDs like methotrexate as a \"steroid sparing \"medication, to see if it might reduce her long-term steroid needs.  However I clearly explained to the patient that this will be mainly for symptomatic reasons and isolated cystitis would not fit the profile of a discrete rheumatologic disorder.    Her daughter is a pharmacist and is familiar with DMARDs (cyclosporine was mentioned " but given the side effects of cyclosporine including hypertension, renal failure and gingival hypertrophy I would recommend against using cyclosporine).  Low-dose methotrexate would be a more reasonable option and we will check her baseline methotrexate labs including complete blood count, ALT, creatinine, screen for hepatitis B and C.  If these labs are stable she will start methotrexate 10 mg weekly with daily folic acid.  Patient and her family explained that methotrexate typically takes 6-8 weeks to take effect.  In the meantime for symptomatic relief she could use low-dose prednisone through the urologist (instead of a high-dose taper of steroid, probably a steady dose like 10 mg prednisone daily may be more prudent).    We will see her back in a month, repeat her methotrexate labs and if she is tolerating methotrexate we can titrate the dose to 15 mg daily.  If she is methotrexate responsive she we will hopefully be able to wean her prednisone subsequently.    Again we discussed that methotrexate is being used as a steroid sparing medication (too many side effects of empiric long-term steroids) and we do not have a discrete explanation for the cause of her cystitis.  Intra bladder steroid injections were discussed and would probably be a safer option than taking prednisone systemically.  She will discuss this with her urologist.    The patient and family also have discussed getting a second urologic opinion with her urologist and that may be a reasonable option to be seen by a urologist either at the [facility] or at the [facility].    Follow-up 1 month    Thank you for asked me to see this patient.    CC Dr. Sheppard, Dr. Shaw ([facility])          NEWTON Umaña is a very nice 68-year-old lady referred by her urologist (Dr. Shaw) for debilitating, recurrent cystitis of 8-month duration.  She is accompanied by her  and her daughter who is a pharmacist today.  Her symptoms started  in February 2024 while she was in [place], she had some dyspareunia during intercourse (dry, painful) and subsequently started noticing dysuria, bladder spasms which have been very debilitating.  She has had multiple cystoscopies, the bladder biopsies did not show malignancy.  According to the patient the most recent cystoscopy showed some ulceration of the bladder mucosa.  She was initially empirically treated with antibiotics for urinary tract infection but despite multiple antibiotic courses did not get relief.  She has almost constant daily bladder and urethral burning.  Urination helps relieve the symptoms temporarily but she has also noticed incontinence after she empties her bladder.  She had imaging studies of the abdomen/pelvis which did not show urolithiasis or bladder lesions.  There is no history of previous urologic procedures, urethral stricture or tuberculosis.  The antibiotics did not help her urinary symptoms but multiple courses of steroids have relieved her symptoms, however after she finishes a steroid course her symptoms return.  Her urologist has mentioned the possibility of doing intravesicular steroid injections.  She tried oxybutynin, vaginal estrogens, gabapentin and even diazepam for her bladder symptoms without relief.    She has a history of osteoarthritis commensurate with age and has had bilateral knee arthroplasties and a left hip arthroplasty but denies any inflammatory swelling of her small joints.  There is no prior history of lupus, sarcoid or ANCA vasculitis.  There is no history of nephritis, nosebleeds, recurrent sinus infections, purpura.    Interestingly she does mention a history of hives in the past and may have had lichen planus (patient not absolutely sure).  She also has prediabetes.    Her most recent cystoscopy according to the accompanying notes showed some wall thickening with urothelial enhancement within the inferior aspect of the bladder, small hiatal hernia and a  4 mm left lobe nodule.    35 pages of accompanying records are reviewed.  The most recent urinalysis from 3 weeks ago shows 3+ leukocyte esterase, more than 100 WBCs, 3-5 RBC and few bacteria.  ggestion  Information displayed in this report may not trend or trigger automated decision support.        Component  Ref Range & Units 3 wk ago   Color, Urine  Yellow Light Yellow   Clarity, Urine  Clear Cloudy Abnormal    Specific Gravity, Urine  1.005 - 1.035 1.010   Glucose, Urine  Negative Negative   Bilirubin, Urine  Negative Negative   Ketone, Urine  Negative Negative   Blood, Urine  Negative Trace Abnormal    pH, Urine  5.0 - 8.5 pH 5.5   Protein, Urine  Negative Negative   Urobilinogen, Urine  <2.0 EU/dL 0.2   Nitrite, Urine  Negative Negative   Leukocyte Esterase, Urine  Negative 3+ Abnormal    RBC, Urine  None Seen, 1-2 /HPF 3-5 Abnormal    WBC, Urine  None Seen, 1-5 /HPF >100 Abnormal    Bacteria, Urine  None Seen, Few /HPF Few   Squamous Epithelial Cells,  Urine  None Seen, 1-5 /HPF None Seen   Hyaline Casts, Urine  None Seen, 1-3 /LPF None Seen       Component  Ref Range & Units 3 wk ago   Sodium  135 - 145 mmol/L 142   Potassium  3.5 - 5.1 mmol/L 4.3   Chloride  98 - 107 mmol/L 105   CO2  22 - 30 mmol/L 25   Creatinine  0.50 - 0.90 mg/dL 0.69   Blood Urea Nitrogen  8.0 - 21.0 mg/dL 16.0   Calcium, Total  8.8 - 10.8 mg/dL 9.1   Glucose  70 - 100 mg/dL 94   eGFR  >=60 mL/min/1.73m(2) >60   Comment: Calculation based on the Chronic Kidney Disease Epidemiology Collaboration (CKD-EPI) equation refit without adjustment for race.   eCrCl (Rx) - Adults  mL/min 98.0     Pertinent medical history notable for prediabetes, history of abdominal wall abscess/cellulitis (Staph aureus)         Active Problem List     Patient Active Problem List   Diagnosis    Arthritis of knee    Anxiety and depression    Obstructive sleep apnea on CPAP    Pure hypercholesterolemia    Hypokalemia    Abdominal wall abscess    Cellulitis of  abdominal wall    Fever, unspecified fever cause    Leukocytosis, unspecified type    Sepsis without acute organ dysfunction, due to unspecified organism (H)    Cellulitis due to Staphylococcus aureus     Past Medical History     Past Medical History:   Diagnosis Date    Arthritis     Asthma     Gastro-oesophageal reflux disease      Past Surgical History     Past Surgical History:   Procedure Laterality Date    ARTHROPLASTY KNEE BILATERAL  10/8/2012    Procedure: ARTHROPLASTY KNEE BILATERAL;  Bilateral Total Knee Arthroplasty;  Surgeon: Nino Andre MD;  Location: [facility] OR    HYSTERECTOMY       Allergy     Allergies   Allergen Reactions    Sulfa Antibiotics Hives    Tramadol Nausea    Macrobid [Nitrofurantoin Anhydrous] Rash     Current Medication List     Current Outpatient Medications   Medication Sig Dispense Refill    albuterol (PROVENTIL) (2.5 MG/3ML) 0.083% neb solution Take 2.5 mg by nebulization every 4 hours as needed for shortness of breath, wheezing or cough      cetirizine (ZYRTEC) 10 MG tablet Take 10 mg by mouth daily as needed.        clobetasol (TEMOVATE) 0.05 % GEL topical gel Apply topically daily.      estradiol 0.01% in vanicream - PHARMACY TO MIX - dispense with applicator Place 2 g vaginally twice a week.      fluticasone-vilanterol (BREO ELLIPTA) 200-25 MCG/ACT inhaler Inhale 1 puff into the lungs daily      gabapentin (NEURONTIN) 400 MG capsule Take 400 mg by mouth 2 times daily.      hydrOXYzine HCl (ATARAX) 10 MG tablet Take 10 mg by mouth daily.      ipratropium - albuterol 0.5 mg/2.5 mg/3 mL (DUONEB) 0.5-2.5 (3) MG/3ML neb solution Take 1 vial by nebulization every 6 hours as needed for shortness of breath, wheezing or cough      montelukast (SINGULAIR) 10 MG tablet Take 10 mg by mouth daily      naproxen sodium (ANAPROX) 220 MG tablet Take 440 mg by mouth 2 times daily (with meals).      OMEPRAZOLE PO Take 40 mg by mouth daily      predniSONE (DELTASONE) 20 MG tablet Take 20 mg by  "mouth as needed As directed      rosuvastatin (CRESTOR) 5 MG tablet Take 5 mg by mouth daily.      sertraline (ZOLOFT) 100 MG tablet Take 100 mg by mouth daily      vibegron (GEMTESA) 75 MG TABS tablet Take 75 mg by mouth daily.      VITAMIN D, CHOLECALCIFEROL, PO Take 2,000 Units by mouth daily      COMPRESSION STOCKINGS 1 each continuous. 1 each 0    COMPRESSION STOCKINGS 1 each continuous. 1 each 0    fluticasone (FLONASE) 50 MCG/ACT nasal spray Spray 1 spray into both nostrils daily as needed for rhinitis or allergies (Patient not taking: Reported on 11/1/2024)      mometasone (ELOCON) 0.1 % external cream Apply topically daily      multivitamin, therapeutic (THERA-VIT) TABS tablet Take 1 tablet by mouth daily       No current facility-administered medications for this visit.       No current facility-administered medications for this visit.        Family History   No family history on file.      Physical Exam     COMPREHENSIVE EXAMINATION:  Vitals:    11/01/24 0811   BP: (!) 146/88   BP Location: Right arm   Patient Position: Sitting   Cuff Size: Adult Large   Pulse: 84   SpO2: 96%   Weight: 112.4 kg (247 lb 14.4 oz)   Height: 1.676 m (5' 6\")     Pleasant lady accompanied by her  and daughter    Head/neck.  No jaundice, no conjunctival pallor, no mouth ulcers, no butterfly rash, no external ocular apathy, no alopecia    Lungs clear to auscultation    Heart sounds regular    Abdomen soft, nontender    Musculoskeletal:    Hands.  There is no swelling or synovitis of the fingers, no swan-neck or boutonniere deformities.    Wrists no synovitis or redness    Elbows full range of motion    Shoulders full range of motion    Hips good flexion and internal rotation bilaterally    Knees full range of motion, healed arthrotomy incisions noted bilaterally, no swelling or redness    Ankles no swelling or synovitis.    There is no purpura on the lower extremities    Labs / Imaging (select studies)     No results found " "for: \"PPDINDURATIO\", \"PPDREDNESS\", \"TBGOLT\", \"RHF\", \"CCPABG\", \"URIC\", \"PP23333\", \"EI398611\", \"ANTIDNA\", \"ANTIDNAINT\", \"CARIA\", \"TP97119\", \"[id number]\", \"ENASM\", \"ENASCL\", \"JO1\", \"ENASSA\", \"ENASSB\", \"CENTA\", \"J9WOKVM\", \"O1GZLJD\", \"FB9490\"   Hemoglobin   Date Value Ref Range Status   02/13/2023 12.0 11.7 - 15.7 g/dL Final   02/12/2023 12.6 11.7 - 15.7 g/dL Final   02/11/2023 12.5 11.7 - 15.7 g/dL Final   10/11/2012 9.9 (L) 11.7 - 15.7 g/dL Final   10/10/2012 7.1 (L) 11.7 - 15.7 g/dL Final   10/09/2012 8.6 (L) 11.7 - 15.7 g/dL Final     Urea Nitrogen   Date Value Ref Range Status   02/13/2023 9 8 - 22 mg/dL Final   02/12/2023 12 8 - 22 mg/dL Final   02/11/2023 16 8 - 22 mg/dL Final     CRP   Date Value Ref Range Status   02/13/2023 13.9 (H) 0.0 - <0.8 mg/dL Final   02/12/2023 19.2 (H) 0.0 - <0.8 mg/dL Final   02/11/2023 15.3 (H) 0.0 - <0.8 mg/dL Final     AST   Date Value Ref Range Status   02/11/2023 22 0 - 40 U/L Final     Albumin   Date Value Ref Range Status   02/11/2023 3.2 (L) 3.5 - 5.0 g/dL Final     Alkaline Phosphatase   Date Value Ref Range Status   02/11/2023 92 45 - 120 U/L Final     ALT   Date Value Ref Range Status   02/11/2023 23 0 - 45 U/L Final          Immunization History     Immunization History   Administered Date(s) Administered    COVID-19 12+ (MODERNA) 12/18/2023, 10/11/2024    COVID-19 Bivalent 12+ (Pfizer) 10/05/2022    COVID-19 MONOVALENT 12+ (Pfizer) 01/29/2021, 02/18/2021, 10/18/2021    COVID-19 Monovalent 12+ (Pfizer 2022) 04/29/2022    Influenza Vaccine >6 months,quad, [facility] 10/03/2020, 10/15/2022               "

## 2024-11-01 NOTE — LETTER
" 11/1/2024      Bernice Umaña  [address] [facility] [address]          This document was created using a software with less than 100% fidelity, at times resulting in unintended, even erroneous syntax and grammar.  The reader is advised to keep this under consideration while reviewing, interpreting this note.      Rheumatology Consult Note      Bernice Umaña     YOB: 1955 Age: 68 year old    Date of visit: 11/01/24    PCP: Hue Sheppard    Chief Complaint     Interstitial cystitis (unknown etiology)/sterile pyuria    Assessment and Plan     The patient has an 8-month history of recurrent, quite debilitating dysuria and bladder spasms due to cystitis of unknown etiology.  Multiple courses of antibiotics failed to resolve her symptoms, bladder biopsies were reportedly normal per patient, urinalysis shows increased WBCs, some RBCs but without definite organism to account for infection.  She has had multiple courses of steroids through her urologist, steroids do help her urinary symptoms but the concern for long-term side effects of empirical steroid therapy, especially given her prediabetes is a concern.    Her bladder symptoms/cystitis does not fit discrete rheumatologic disorder i.e. lupus, vasculitis etc. she does not have any joint inflammation, palpable purpura or other signs of an autoimmune rheumatologic disorder.  We will check some additional serologies for vasculitis including DC-3 antibody and MPO antibody to round out her rheumatologic workup.  QuantiFERON also ordered to screen for TB given sterile pyuria    Given the response of her symptoms to steroids, we did discuss using DMARDs like methotrexate as a \"steroid sparing \"medication, to see if it might reduce her long-term steroid needs.  However I clearly explained to the patient that this will be mainly for symptomatic reasons and isolated cystitis would not fit the profile of a discrete rheumatologic disorder.    Her " daughter is a pharmacist and is familiar with DMARDs (cyclosporine was mentioned but given the side effects of cyclosporine including hypertension, renal failure and gingival hypertrophy I would recommend against using cyclosporine).  Low-dose methotrexate would be a more reasonable option and we will check her baseline methotrexate labs including complete blood count, ALT, creatinine, screen for hepatitis B and C.  If these labs are stable she will start methotrexate 10 mg weekly with daily folic acid.  Patient and her family explained that methotrexate typically takes 6-8 weeks to take effect.  In the meantime for symptomatic relief she could use low-dose prednisone through the urologist (instead of a high-dose taper of steroid, probably a steady dose like 10 mg prednisone daily may be more prudent).    We will see her back in a month, repeat her methotrexate labs and if she is tolerating methotrexate we can titrate the dose to 15 mg daily.  If she is methotrexate responsive she we will hopefully be able to wean her prednisone subsequently.    Again we discussed that methotrexate is being used as a steroid sparing medication (too many side effects of empiric long-term steroids) and we do not have a discrete explanation for the cause of her cystitis.  Intra bladder steroid injections were discussed and would probably be a safer option than taking prednisone systemically.  She will discuss this with her urologist.    The patient and family also have discussed getting a second urologic opinion with her urologist and that may be a reasonable option to be seen by a urologist either at the [facility] or at the [facility].    Follow-up 1 month    Thank you for asked me to see this patient.    CC Dr. Sheppard, Dr. Shaw ([facility])    Return to clinic: {NUMBERS(MULTIPLE):922677} {DAYS:100229}      HPI   Bernice Umaña is a very nice 68-year-old lady referred by her urologist (Dr. Shaw) for debilitating,  recurrent cystitis of 8-month duration.  She is accompanied by her  and her daughter who is a pharmacist today.  Her symptoms started in February 2024 while she was in [place], she had some dyspareunia during intercourse (dry, painful) and subsequently started noticing dysuria, bladder spasms which have been very debilitating.  She has had multiple cystoscopies, the bladder biopsies did not show malignancy.  According to the patient the most recent cystoscopy showed some ulceration of the bladder mucosa.  She was initially empirically treated with antibiotics for urinary tract infection but despite multiple antibiotic courses did not get relief.  She has almost constant daily bladder and urethral burning.  Urination helps relieve the symptoms temporarily but she has also noticed incontinence after she empties her bladder.  She had imaging studies of the abdomen/pelvis which did not show urolithiasis or bladder lesions.  There is no history of previous urologic procedures, urethral stricture or tuberculosis.  The antibiotics did not help her urinary symptoms but multiple courses of steroids have relieved her symptoms, however after she finishes a steroid course her symptoms return.  Her urologist has mentioned the possibility of doing intravesicular steroid injections.  She tried oxybutynin, vaginal estrogens, gabapentin and even diazepam for her bladder symptoms without relief.    She has a history of osteoarthritis commensurate with age and has had bilateral knee arthroplasties and a left hip arthroplasty but denies any inflammatory swelling of her small joints.  There is no prior history of lupus, sarcoid or ANCA vasculitis.  There is no history of nephritis, nosebleeds, recurrent sinus infections, purpura.    Interestingly she does mention a history of hives in the past and may have had lichen planus (patient not absolutely sure).  She also has prediabetes.    Her most recent cystoscopy according to the  accompanying notes showed some wall thickening with urothelial enhancement within the inferior aspect of the bladder, small hiatal hernia and a 4 mm left lobe nodule.    35 pages of accompanying records are reviewed.  The most recent urinalysis from 3 weeks ago shows 3+ leukocyte esterase, more than 100 WBCs, 3-5 RBC and few bacteria.  ggestion  Information displayed in this report may not trend or trigger automated decision support.        Component  Ref Range & Units 3 wk ago   Color, Urine  Yellow Light Yellow   Clarity, Urine  Clear Cloudy Abnormal    Specific Gravity, Urine  1.005 - 1.035 1.010   Glucose, Urine  Negative Negative   Bilirubin, Urine  Negative Negative   Ketone, Urine  Negative Negative   Blood, Urine  Negative Trace Abnormal    pH, Urine  5.0 - 8.5 pH 5.5   Protein, Urine  Negative Negative   Urobilinogen, Urine  <2.0 EU/dL 0.2   Nitrite, Urine  Negative Negative   Leukocyte Esterase, Urine  Negative 3+ Abnormal    RBC, Urine  None Seen, 1-2 /HPF 3-5 Abnormal    WBC, Urine  None Seen, 1-5 /HPF >100 Abnormal    Bacteria, Urine  None Seen, Few /HPF Few   Squamous Epithelial Cells,  Urine  None Seen, 1-5 /HPF None Seen   Hyaline Casts, Urine  None Seen, 1-3 /LPF None Seen       Component  Ref Range & Units 3 wk ago   Sodium  135 - 145 mmol/L 142   Potassium  3.5 - 5.1 mmol/L 4.3   Chloride  98 - 107 mmol/L 105   CO2  22 - 30 mmol/L 25   Creatinine  0.50 - 0.90 mg/dL 0.69   Blood Urea Nitrogen  8.0 - 21.0 mg/dL 16.0   Calcium, Total  8.8 - 10.8 mg/dL 9.1   Glucose  70 - 100 mg/dL 94   eGFR  >=60 mL/min/1.73m(2) >60   Comment: Calculation based on the Chronic Kidney Disease Epidemiology Collaboration (CKD-EPI) equation refit without adjustment for race.   eCrCl (Rx) - Adults  mL/min 98.0     Pertinent medical history notable for prediabetes, history of abdominal wall abscess/cellulitis (Staph aureus)         Active Problem List     Patient Active Problem List   Diagnosis     Arthritis of knee      Anxiety and depression     Obstructive sleep apnea on CPAP     Pure hypercholesterolemia     Hypokalemia     Abdominal wall abscess     Cellulitis of abdominal wall     Fever, unspecified fever cause     Leukocytosis, unspecified type     Sepsis without acute organ dysfunction, due to unspecified organism (H)     Cellulitis due to Staphylococcus aureus     Past Medical History     Past Medical History:   Diagnosis Date     Arthritis      Asthma      Gastro-oesophageal reflux disease      Past Surgical History     Past Surgical History:   Procedure Laterality Date     ARTHROPLASTY KNEE BILATERAL  10/8/2012    Procedure: ARTHROPLASTY KNEE BILATERAL;  Bilateral Total Knee Arthroplasty;  Surgeon: iNno Andre MD;  Location: [facility] OR     HYSTERECTOMY       Allergy     Allergies   Allergen Reactions     Sulfa Antibiotics Hives     Tramadol Nausea     Macrobid [Nitrofurantoin Anhydrous] Rash     Current Medication List     Current Outpatient Medications   Medication Sig Dispense Refill     albuterol (PROVENTIL) (2.5 MG/3ML) 0.083% neb solution Take 2.5 mg by nebulization every 4 hours as needed for shortness of breath, wheezing or cough       cetirizine (ZYRTEC) 10 MG tablet Take 10 mg by mouth daily as needed.         clobetasol (TEMOVATE) 0.05 % GEL topical gel Apply topically daily.       estradiol 0.01% in vanicream - PHARMACY TO MIX - dispense with applicator Place 2 g vaginally twice a week.       fluticasone-vilanterol (BREO ELLIPTA) 200-25 MCG/ACT inhaler Inhale 1 puff into the lungs daily       gabapentin (NEURONTIN) 400 MG capsule Take 400 mg by mouth 2 times daily.       hydrOXYzine HCl (ATARAX) 10 MG tablet Take 10 mg by mouth daily.       ipratropium - albuterol 0.5 mg/2.5 mg/3 mL (DUONEB) 0.5-2.5 (3) MG/3ML neb solution Take 1 vial by nebulization every 6 hours as needed for shortness of breath, wheezing or cough       montelukast (SINGULAIR) 10 MG tablet Take 10 mg by mouth daily       naproxen sodium  "(ANAPROX) 220 MG tablet Take 440 mg by mouth 2 times daily (with meals).       OMEPRAZOLE PO Take 40 mg by mouth daily       predniSONE (DELTASONE) 20 MG tablet Take 20 mg by mouth as needed As directed       rosuvastatin (CRESTOR) 5 MG tablet Take 5 mg by mouth daily.       sertraline (ZOLOFT) 100 MG tablet Take 100 mg by mouth daily       vibegron (GEMTESA) 75 MG TABS tablet Take 75 mg by mouth daily.       VITAMIN D, CHOLECALCIFEROL, PO Take 2,000 Units by mouth daily       COMPRESSION STOCKINGS 1 each continuous. 1 each 0     COMPRESSION STOCKINGS 1 each continuous. 1 each 0     fluticasone (FLONASE) 50 MCG/ACT nasal spray Spray 1 spray into both nostrils daily as needed for rhinitis or allergies (Patient not taking: Reported on 11/1/2024)       mometasone (ELOCON) 0.1 % external cream Apply topically daily       multivitamin, therapeutic (THERA-VIT) TABS tablet Take 1 tablet by mouth daily       No current facility-administered medications for this visit.       No current facility-administered medications for this visit.        Family History   No family history on file.      Physical Exam     COMPREHENSIVE EXAMINATION:  Vitals:    11/01/24 0811   BP: (!) 146/88   BP Location: Right arm   Patient Position: Sitting   Cuff Size: Adult Large   Pulse: 84   SpO2: 96%   Weight: 112.4 kg (247 lb 14.4 oz)   Height: 1.676 m (5' 6\")     .***Pleasant lady accompanied by her  and daughter    Head/neck.  No jaundice, no conjunctival pallor, no mouth ulcers, no butterfly rash, no external ocular apathy, no alopecia    Lungs clear to auscultation    Heart sounds regular    Abdomen soft, nontender    Musculoskeletal:    Hands.  There is no swelling or synovitis of the fingers, no swan-neck or boutonniere deformities.    Wrists no synovitis or redness    Elbows full range of motion    Shoulders full range of motion    Hips good flexion and internal rotation bilaterally    Knees full range of motion, healed arthrotomy " "incisions noted bilaterally, no swelling or redness    Ankles no swelling or synovitis.    There is no purpura on the lower extremities    Labs / Imaging (select studies)     No results found for: \"PPDINDURATIO\", \"PPDREDNESS\", \"TBGOLT\", \"RHF\", \"CCPABG\", \"URIC\", \"PS26078\", \"DT023319\", \"ANTIDNA\", \"ANTIDNAINT\", \"CARIA\", \"XY07779\", \"YG536031\", \"ENASM\", \"ENASCL\", \"JO1\", \"ENASSA\", \"ENASSB\", \"CENTA\", \"J1DFSBN\", \"M7GYWDN\", \"GK9575\"   Hemoglobin   Date Value Ref Range Status   02/13/2023 12.0 11.7 - 15.7 g/dL Final   02/12/2023 12.6 11.7 - 15.7 g/dL Final   02/11/2023 12.5 11.7 - 15.7 g/dL Final   10/11/2012 9.9 (L) 11.7 - 15.7 g/dL Final   10/10/2012 7.1 (L) 11.7 - 15.7 g/dL Final   10/09/2012 8.6 (L) 11.7 - 15.7 g/dL Final     Urea Nitrogen   Date Value Ref Range Status   02/13/2023 9 8 - 22 mg/dL Final   02/12/2023 12 8 - 22 mg/dL Final   02/11/2023 16 8 - 22 mg/dL Final     CRP   Date Value Ref Range Status   02/13/2023 13.9 (H) 0.0 - <0.8 mg/dL Final   02/12/2023 19.2 (H) 0.0 - <0.8 mg/dL Final   02/11/2023 15.3 (H) 0.0 - <0.8 mg/dL Final     AST   Date Value Ref Range Status   02/11/2023 22 0 - 40 U/L Final     Albumin   Date Value Ref Range Status   02/11/2023 3.2 (L) 3.5 - 5.0 g/dL Final     Alkaline Phosphatase   Date Value Ref Range Status   02/11/2023 92 45 - 120 U/L Final     ALT   Date Value Ref Range Status   02/11/2023 23 0 - 45 U/L Final          Immunization History     Immunization History   Administered Date(s) Administered     COVID-19 12+ (MODERNA) 12/18/2023, 10/11/2024     COVID-19 Bivalent 12+ (Pfizer) 10/05/2022     COVID-19 MONOVALENT 12+ (Pfizer) 01/29/2021, 02/18/2021, 10/18/2021     COVID-19 Monovalent 12+ (Pfizer 2022) 04/29/2022     Influenza Vaccine >6 months,quad, PF 10/03/2020, 10/15/2022                   Sincerely,        Fran Gutiérrez MD      "

## 2024-11-02 LAB
GAMMA INTERFERON BACKGROUND BLD IA-ACNC: 0.05 IU/ML
M TB IFN-G BLD-IMP: NEGATIVE
M TB IFN-G CD4+ BCKGRND COR BLD-ACNC: 9.95 IU/ML
MITOGEN IGNF BCKGRD COR BLD-ACNC: 0 IU/ML
MITOGEN IGNF BCKGRD COR BLD-ACNC: 0.01 IU/ML
QUANTIFERON MITOGEN: 10 IU/ML
QUANTIFERON NIL TUBE: 0.05 IU/ML
QUANTIFERON TB1 TUBE: 0.06 IU/ML
QUANTIFERON TB2 TUBE: 0.05

## 2024-11-04 ENCOUNTER — TELEPHONE (OUTPATIENT)
Dept: RHEUMATOLOGY | Facility: CLINIC | Age: 69
End: 2024-11-04
Payer: COMMERCIAL

## 2024-11-04 DIAGNOSIS — Z79.899 HIGH RISK MEDICATION USE: ICD-10-CM

## 2024-11-04 DIAGNOSIS — Z71.89 ENCOUNTER TO DISCUSS TREATMENT OPTIONS: ICD-10-CM

## 2024-11-04 DIAGNOSIS — N30.11 INTERSTITIAL CYSTITIS (CHRONIC) WITH HEMATURIA: Primary | ICD-10-CM

## 2024-11-04 NOTE — TELEPHONE ENCOUNTER
Do lab in 4 weeks.     Patient will do lab with [facility]   Lab Phone # [phone], lab fax # [phone].    Order faxed.

## 2024-11-05 LAB
MYELOPEROXIDASE AB SER IA-ACNC: <0.3 U/ML
MYELOPEROXIDASE AB SER IA-ACNC: NEGATIVE
PROTEINASE3 AB SER IA-ACNC: <1 U/ML
PROTEINASE3 AB SER IA-ACNC: NEGATIVE

## 2024-12-03 ENCOUNTER — OFFICE VISIT (OUTPATIENT)
Dept: RHEUMATOLOGY | Facility: CLINIC | Age: 69
End: 2024-12-03
Payer: COMMERCIAL

## 2024-12-03 VITALS
OXYGEN SATURATION: 97 % | WEIGHT: 247 LBS | BODY MASS INDEX: 39.7 KG/M2 | SYSTOLIC BLOOD PRESSURE: 150 MMHG | HEIGHT: 66 IN | HEART RATE: 63 BPM | DIASTOLIC BLOOD PRESSURE: 86 MMHG

## 2024-12-03 DIAGNOSIS — Z11.59 NEED FOR HEPATITIS B SCREENING TEST: ICD-10-CM

## 2024-12-03 DIAGNOSIS — Z71.89 ENCOUNTER TO DISCUSS TREATMENT OPTIONS: ICD-10-CM

## 2024-12-03 DIAGNOSIS — Z11.1 TUBERCULOSIS SCREENING: ICD-10-CM

## 2024-12-03 DIAGNOSIS — N30.90 CYSTITIS: Primary | ICD-10-CM

## 2024-12-03 DIAGNOSIS — R10.2 PELVIC PAIN: ICD-10-CM

## 2024-12-03 DIAGNOSIS — Z79.899 HIGH RISK MEDICATION USE: ICD-10-CM

## 2024-12-03 RX ORDER — METHOTREXATE 2.5 MG/1
15 TABLET ORAL
Qty: 24 TABLET | Refills: 1 | Status: SHIPPED | OUTPATIENT
Start: 2024-12-03

## 2024-12-03 RX ORDER — PREDNISONE 5 MG/1
10 TABLET ORAL DAILY
Qty: 60 TABLET | Refills: 1 | Status: SHIPPED | OUTPATIENT
Start: 2024-12-03

## 2024-12-03 NOTE — PROGRESS NOTES
"  Assessment and Plan    1.  Patient was started on methotrexate as a \"steroid sparing \"agent for recurrent, debilitating steroid responsive cystitis which has been going on for 8 months.  Her serologies were negative and the cystitis does not fit into a discrete rheumatologic disorder.  The goal would be to help her wean herself off prednisone eventually and we will see if methotrexate can help her accomplish that.    Plan:1. Increase methotrexate dose to 15 mg weekly, continue folic acid 1 mg daily    2.  In 5 weeks she will REDUCE PREDNISONE to 7.5 mg daily.    She will have methotrexate monitoring labs (CBC, ALT, creatinine) prior to her next visit in 6 weeks.  If she continues to do well we will plan on further prednisone taper going forward.    CC PCP, Bonifacio Shaw MD            Rheumatology follow-up visit note         NEWTON Queen is a very nice 68-year-old lady referred by her urologist (Dr.Jodi Shaw) for debilitating, recurrent cystitis of 8-month duration.Her symptoms started in February 2024 while she was in [place], she had some dyspareunia during intercourse (dry, painful) and subsequently started noticing dysuria, bladder spasms which have been very debilitating.  She has had multiple cystoscopies, the bladder biopsies did not show malignancy.  According to the patient the most recent cystoscopy showed some ulceration of the bladder mucosa.  She was initially empirically treated with antibiotics for urinary tract infection but despite multiple antibiotic courses did not get relief.  She has almost constant daily bladder and urethral burning.  Urination helps relieve the symptoms temporarily but she has also noticed incontinence after she empties her bladder.  She had imaging studies of the abdomen/pelvis which did not show urolithiasis or bladder lesions.  There is no history of previous urologic procedures, urethral stricture or tuberculosis.  The antibiotics did not help her urinary symptoms but " "multiple courses of steroids have relieved her symptoms, however after she finishes a steroid course her symptoms return.  Her urologist has mentioned the possibility of doing intravesicular steroid injections.  She tried oxybutynin, vaginal estrogens, gabapentin and even diazepam for her bladder symptoms without relief.    Given the response of her cystitis to oral steroids, we started her on methotrexate as a \"steroid sparing \"medication.  She is tolerating methotrexate well (10 mg weekly) along with daily folic acid.  She also had another cystoscopy and had intravesicular Kenalog injections through urology.  She has not had any flares of her cystitis and is quite relieved that her cystitis and bladder spasms have not been bothering her for the past 3-4 weeks.    Methotrexate labs: Creatinine stable at 0.71, ALT 18     She has a history of osteoarthritis commensurate with age and has had bilateral knee arthroplasties and a left hip arthroplasty but denies any inflammatory swelling of her small joints.  There is no prior history of lupus, sarcoid or ANCA vasculitis.  There is no history of nephritis, nosebleeds, recurrent sinus infections, purpura.          DETAILED EXAMINATION  12/03/24  :    Vitals:    12/03/24 1052   BP: (!) 176/83   BP Location: Right arm   Patient Position: Sitting   Cuff Size: Adult Regular   Pulse: 63   SpO2: 97%   Weight: 112 kg (247 lb)   Height: 1.676 m (5' 6\")     Patient is able, alert and oriented x 3.  She is accompanied by her .    Head/neck.  No jaundice, no mucositis, no alopecia, no external ocular redness    Lungs clear without crackles or wheezing (patient recently fell and had right rib fractures)    Heart sounds regular    Musculoskeletal:    There is no swelling or synovitis of the fingers, wrists, elbows.    Normal range of motion in the shoulders and hips.    No swelling or redness of the ankles.  Patient Active Problem List    Diagnosis Date Noted    Cellulitis due " to Staphylococcus aureus 02/14/2023     Priority: Medium    Obstructive sleep apnea on CPAP 02/11/2023     Priority: Medium    Pure hypercholesterolemia 02/11/2023     Priority: Medium    Hypokalemia 02/11/2023     Priority: Medium    Abdominal wall abscess 02/11/2023     Priority: Medium    Cellulitis of abdominal wall 02/11/2023     Priority: Medium    Fever, unspecified fever cause 02/11/2023     Priority: Medium    Leukocytosis, unspecified type 02/11/2023     Priority: Medium    Sepsis without acute organ dysfunction, due to unspecified organism (H) 02/11/2023     Priority: Medium    Anxiety and depression 01/09/2017     Priority: Medium    Arthritis of knee 10/08/2012     Priority: Medium     Past Surgical History:   Procedure Laterality Date    ARTHROPLASTY KNEE BILATERAL  10/8/2012    Procedure: ARTHROPLASTY KNEE BILATERAL;  Bilateral Total Knee Arthroplasty;  Surgeon: Nino Andre MD;  Location: [facility] OR    HYSTERECTOMY        Past Medical History:   Diagnosis Date    Arthritis     Asthma     Gastro-oesophageal reflux disease      Allergies   Allergen Reactions    Sulfa Antibiotics Hives    Tramadol Nausea    Latex Rash    Macrobid [Nitrofurantoin Anhydrous] Rash     Current Outpatient Medications   Medication Sig Dispense Refill    albuterol (PROVENTIL) (2.5 MG/3ML) 0.083% neb solution Take 2.5 mg by nebulization every 4 hours as needed for shortness of breath, wheezing or cough      cetirizine (ZYRTEC) 10 MG tablet Take 10 mg by mouth daily as needed.        clobetasol (TEMOVATE) 0.05 % GEL topical gel Apply topically daily.      estradiol 0.01% in vanicream - PHARMACY TO MIX - dispense with applicator Place 2 g vaginally twice a week.      fluticasone-vilanterol (BREO ELLIPTA) 200-25 MCG/ACT inhaler Inhale 1 puff into the lungs daily      folic acid (FOLVITE) 1 MG tablet Take 1 tablet (1 mg) by mouth daily. 30 tablet 3    gabapentin (NEURONTIN) 400 MG capsule Take 400 mg by mouth 2 times daily.       hydrOXYzine HCl (ATARAX) 10 MG tablet Take 10 mg by mouth daily.      ipratropium - albuterol 0.5 mg/2.5 mg/3 mL (DUONEB) 0.5-2.5 (3) MG/3ML neb solution Take 1 vial by nebulization every 6 hours as needed for shortness of breath, wheezing or cough      methotrexate 2.5 MG tablet Take 4 tablets (10 mg) by mouth every 7 days. 16 tablet 1    mometasone (ELOCON) 0.1 % external cream Apply topically daily      montelukast (SINGULAIR) 10 MG tablet Take 10 mg by mouth daily      multivitamin, therapeutic (THERA-VIT) TABS tablet Take 1 tablet by mouth daily      naproxen sodium (ANAPROX) 220 MG tablet Take 440 mg by mouth 2 times daily (with meals).      OMEPRAZOLE PO Take 40 mg by mouth daily      predniSONE (DELTASONE) 20 MG tablet Take 20 mg by mouth as needed As directed      rosuvastatin (CRESTOR) 5 MG tablet Take 5 mg by mouth daily.      sertraline (ZOLOFT) 100 MG tablet Take 100 mg by mouth daily      vibegron (GEMTESA) 75 MG TABS tablet Take 75 mg by mouth daily.      VITAMIN D, CHOLECALCIFEROL, PO Take 2,000 Units by mouth daily      COMPRESSION STOCKINGS 1 each continuous. (Patient not taking: Reported on 12/3/2024) 1 each 0    COMPRESSION STOCKINGS 1 each continuous. (Patient not taking: Reported on 12/3/2024) 1 each 0    fluticasone (FLONASE) 50 MCG/ACT nasal spray Spray 1 spray into both nostrils daily as needed for rhinitis or allergies (Patient not taking: Reported on 12/3/2024)       family history is not on file.  Social Connections: Socially Integrated (11/7/2024)    Received from [facility]    Social Connection and Isolation Panel [NHANES]     Frequency of Communication with Friends and Family: More than three times a week     Frequency of Social Gatherings with Friends and Family: More than three times a week     Attends Buddhist Services: More than 4 times per year     Active Member of Clubs or Organizations: Yes     Attends Club or Organization Meetings: More than 4 times per  year     Marital Status:           WBC Count   Date Value Ref Range Status   11/01/2024 10.9 4.0 - 11.0 10e3/uL Final     RBC Count   Date Value Ref Range Status   11/01/2024 4.66 3.80 - 5.20 10e6/uL Final     Hemoglobin   Date Value Ref Range Status   11/01/2024 14.0 11.7 - 15.7 g/dL Final   10/11/2012 9.9 (L) 11.7 - 15.7 g/dL Final     Hematocrit   Date Value Ref Range Status   11/01/2024 43.4 35.0 - 47.0 % Final     MCV   Date Value Ref Range Status   11/01/2024 93 78 - 100 fL Final     MCH   Date Value Ref Range Status   11/01/2024 30.0 26.5 - 33.0 pg Final     Platelet Count   Date Value Ref Range Status   11/01/2024 258 150 - 450 10e3/uL Final     % Lymphocytes   Date Value Ref Range Status   11/01/2024 18 % Final     AST   Date Value Ref Range Status   02/11/2023 22 0 - 40 U/L Final     ALT   Date Value Ref Range Status   11/01/2024 24 0 - 50 U/L Final     Albumin   Date Value Ref Range Status   02/11/2023 3.2 (L) 3.5 - 5.0 g/dL Final     Alkaline Phosphatase   Date Value Ref Range Status   02/11/2023 92 45 - 120 U/L Final     Creatinine   Date Value Ref Range Status   11/01/2024 0.91 0.51 - 0.95 mg/dL Final     GFR Estimate   Date Value Ref Range Status   11/01/2024 69 >60 mL/min/1.73m2 Final     Comment:     eGFR calculated using 2021 CKD-EPI equation.     CRP   Date Value Ref Range Status   02/13/2023 13.9 (H) 0.0 - <0.8 mg/dL Final

## 2024-12-05 ENCOUNTER — TELEPHONE (OUTPATIENT)
Dept: RHEUMATOLOGY | Facility: CLINIC | Age: 69
End: 2024-12-05

## 2024-12-05 NOTE — TELEPHONE ENCOUNTER
Patient will do lab with [facility] prior to appt in January with Dr. Gutiérrez.     Lab order faxed to [facility] @ [phone].

## 2025-01-13 ENCOUNTER — TELEPHONE (OUTPATIENT)
Dept: RHEUMATOLOGY | Facility: CLINIC | Age: 70
End: 2025-01-13

## 2025-01-13 ENCOUNTER — VIRTUAL VISIT (OUTPATIENT)
Dept: RHEUMATOLOGY | Facility: CLINIC | Age: 70
End: 2025-01-13
Payer: COMMERCIAL

## 2025-01-13 DIAGNOSIS — N30.11 INTERSTITIAL CYSTITIS (CHRONIC) WITH HEMATURIA: Primary | ICD-10-CM

## 2025-01-13 DIAGNOSIS — Z71.89 ENCOUNTER TO DISCUSS TREATMENT OPTIONS: ICD-10-CM

## 2025-01-13 DIAGNOSIS — Z79.899 HIGH RISK MEDICATION USE: ICD-10-CM

## 2025-01-13 PROCEDURE — 98006 SYNCH AUDIO-VIDEO EST MOD 30: CPT | Performed by: INTERNAL MEDICINE

## 2025-01-13 NOTE — PROGRESS NOTES
Virtual Visit Details    Type of service:  Video Visit   Video Start Time: 9:41 AM  Video End Time: 9:53 AM  Face-to-face time: 9:42 AM-9:53 AM    Originating Location (pt. Location): Home    Distant Location (provider location):  Off-site  Platform used for Video Visit: David    Face-to-face screen time: 9:41 AM-9:53 AM        Assessment and Plan    Bernice's cystitis related symptoms seem to be under control, she is tolerating methotrexate well and has managed to wean her prednisone dose to 7.5 mg daily.    I gave her instructions to gradually reduce prednisone (5 mg daily till January 25, 2.5 mg daily till February 10 and if her symptoms remain under control to stop prednisone after that).  She will stay on methotrexate 15 mg weekly along with folic acid.  If her cystitis symptoms recur as she is weaning prednisone dose we can do a slower taper.    She is planning a cruise to [place] and [place] in May.    Follow-up 4-5 weeks.    CC PCP            Rheumatology follow-up visit note           Bernice is a very nice 68-year-old lady referred by her urologist (Dr.Jodi Shaw) for debilitating, recurrent cystitis of 8-month duration.Her symptoms started in February 2024 while she was in [place], she had some dyspareunia during intercourse (dry, painful) and subsequently started noticing dysuria, bladder spasms which have been very debilitating.  She has had multiple cystoscopies, the bladder biopsies did not show malignancy.  According to the patient the most recent cystoscopy showed some ulceration of the bladder mucosa.  She was initially empirically treated with antibiotics for urinary tract infection but despite multiple antibiotic courses did not get relief.  She has almost constant daily bladder and urethral burning.  Urination helps relieve the symptoms temporarily but she has also noticed incontinence after she empties her bladder.  She had imaging studies of the abdomen/pelvis which did not show  "urolithiasis or bladder lesions.  There is no history of previous urologic procedures, urethral stricture or tuberculosis.  The antibiotics did not help her urinary symptoms but multiple courses of steroids have relieved her symptoms, however after she finishes a steroid course her symptoms return.  Her urologist has mentioned the possibility of doing intravesicular steroid injections.  She tried oxybutynin, vaginal estrogens, gabapentin and even diazepam for her bladder symptoms without relief.    Given the response of her cystitis to oral steroids, we started her on methotrexate as a \"steroid sparing \"medication.  She is tolerating methotrexate well (15 mg weekly) along with daily folic acid.  She has not had any flares of her cystitis and is quite relieved that her cystitis and bladder spasms have not been bothering her for the past 2 months.  She is tolerating methotrexate well.  Her prednisone dose is down to 7.5 mg daily and she would like to come off it if possible (weight gain, appetite) and we discussed that.       She has a history of osteoarthritis commensurate with age and has had bilateral knee arthroplasties and a left hip arthroplasty but denies any inflammatory swelling of her small joints.  There is no prior history of lupus, sarcoid or ANCA vasculitis.  There is no history of nephritis, nosebleeds, recurrent sinus infections, purpura.            DETAILED EXAMINATION  01/13/25  :    There were no vitals filed for this visit.      Patient is able, alert and oriented x 3.  Not in any discomfort.    Head/neck.  No rash, no ocular redness, no alopecia    Due to the virtual nature of the visit a more detailed physical exam is not feasible      Patient Active Problem List    Diagnosis Date Noted    Cellulitis due to Staphylococcus aureus 02/14/2023     Priority: Medium    Obstructive sleep apnea on CPAP 02/11/2023     Priority: Medium    Pure hypercholesterolemia 02/11/2023     Priority: Medium    " Hypokalemia 02/11/2023     Priority: Medium    Abdominal wall abscess 02/11/2023     Priority: Medium    Cellulitis of abdominal wall 02/11/2023     Priority: Medium    Fever, unspecified fever cause 02/11/2023     Priority: Medium    Leukocytosis, unspecified type 02/11/2023     Priority: Medium    Sepsis without acute organ dysfunction, due to unspecified organism (H) 02/11/2023     Priority: Medium    Anxiety and depression 01/09/2017     Priority: Medium    Arthritis of knee 10/08/2012     Priority: Medium     Past Surgical History:   Procedure Laterality Date    ARTHROPLASTY KNEE BILATERAL  10/8/2012    Procedure: ARTHROPLASTY KNEE BILATERAL;  Bilateral Total Knee Arthroplasty;  Surgeon: Nino Andre MD;  Location: [facility] OR    HYSTERECTOMY        Past Medical History:   Diagnosis Date    Arthritis     Asthma     Gastro-oesophageal reflux disease      Allergies   Allergen Reactions    Sulfa Antibiotics Hives    Tramadol Nausea    Latex Rash    Macrobid [Nitrofurantoin Anhydrous] Rash     Current Outpatient Medications   Medication Sig Dispense Refill    albuterol (PROVENTIL) (2.5 MG/3ML) 0.083% neb solution Take 2.5 mg by nebulization every 4 hours as needed for shortness of breath, wheezing or cough      cetirizine (ZYRTEC) 10 MG tablet Take 10 mg by mouth daily as needed.        clobetasol (TEMOVATE) 0.05 % GEL topical gel Apply topically daily.      COMPRESSION STOCKINGS 1 each continuous. (Patient not taking: Reported on 1/13/2025) 1 each 0    COMPRESSION STOCKINGS 1 each continuous. 1 each 0    estradiol 0.01% in vanicream - PHARMACY TO MIX - dispense with applicator Place 2 g vaginally twice a week.      fluticasone (FLONASE) 50 MCG/ACT nasal spray Spray 1 spray into both nostrils daily as needed for rhinitis or allergies.      fluticasone-vilanterol (BREO ELLIPTA) 200-25 MCG/ACT inhaler Inhale 1 puff into the lungs daily      folic acid (FOLVITE) 1 MG tablet Take 1 tablet (1 mg) by mouth daily. 30 tablet  3    gabapentin (NEURONTIN) 400 MG capsule Take 400 mg by mouth 2 times daily.      hydrOXYzine HCl (ATARAX) 10 MG tablet Take 10 mg by mouth daily.      ipratropium - albuterol 0.5 mg/2.5 mg/3 mL (DUONEB) 0.5-2.5 (3) MG/3ML neb solution Take 1 vial by nebulization every 6 hours as needed for shortness of breath, wheezing or cough      methotrexate 2.5 MG tablet Take 6 tablets (15 mg) by mouth every 7 days. 24 tablet 1    mometasone (ELOCON) 0.1 % external cream Apply topically daily (Patient not taking: Reported on 1/13/2025)      montelukast (SINGULAIR) 10 MG tablet Take 10 mg by mouth daily      multivitamin, therapeutic (THERA-VIT) TABS tablet Take 1 tablet by mouth daily (Patient not taking: Reported on 1/13/2025)      naproxen sodium (ANAPROX) 220 MG tablet Take 440 mg by mouth 2 times daily (with meals). (Patient not taking: Reported on 1/13/2025)      OMEPRAZOLE PO Take 40 mg by mouth daily      predniSONE (DELTASONE) 5 MG tablet Take 2 tablets (10 mg) by mouth daily. 60 tablet 1    rosuvastatin (CRESTOR) 5 MG tablet Take 5 mg by mouth daily.      sertraline (ZOLOFT) 100 MG tablet Take 100 mg by mouth daily      vibegron (GEMTESA) 75 MG TABS tablet Take 75 mg by mouth daily.      VITAMIN D, CHOLECALCIFEROL, PO Take 2,000 Units by mouth daily       family history is not on file.  Social Connections: Socially Integrated (11/7/2024)    Received [facility]    Social Connection and Isolation Panel [NHANES]     Frequency of Communication with Friends and Family: More than three times a week     Frequency of Social Gatherings with Friends and Family: More than three times a week     Attends Yazidism Services: More than 4 times per year     Active Member of Clubs or Organizations: Yes     Attends Club or Organization Meetings: More than 4 times per year     Marital Status:           WBC Count   Date Value Ref Range Status   11/01/2024 10.9 4.0 - 11.0 10e3/uL Final     RBC Count    Date Value Ref Range Status   11/01/2024 4.66 3.80 - 5.20 10e6/uL Final     Hemoglobin   Date Value Ref Range Status   11/01/2024 14.0 11.7 - 15.7 g/dL Final   10/11/2012 9.9 (L) 11.7 - 15.7 g/dL Final     Hematocrit   Date Value Ref Range Status   11/01/2024 43.4 35.0 - 47.0 % Final     MCV   Date Value Ref Range Status   11/01/2024 93 78 - 100 fL Final     MCH   Date Value Ref Range Status   11/01/2024 30.0 26.5 - 33.0 pg Final     Platelet Count   Date Value Ref Range Status   11/01/2024 258 150 - 450 10e3/uL Final     % Lymphocytes   Date Value Ref Range Status   11/01/2024 18 % Final     AST   Date Value Ref Range Status   02/11/2023 22 0 - 40 U/L Final     ALT   Date Value Ref Range Status   11/01/2024 24 0 - 50 U/L Final     Albumin   Date Value Ref Range Status   02/11/2023 3.2 (L) 3.5 - 5.0 g/dL Final     Alkaline Phosphatase   Date Value Ref Range Status   02/11/2023 92 45 - 120 U/L Final     Creatinine   Date Value Ref Range Status   11/01/2024 0.91 0.51 - 0.95 mg/dL Final     GFR Estimate   Date Value Ref Range Status   11/01/2024 69 >60 mL/min/1.73m2 Final     Comment:     eGFR calculated using 2021 CKD-EPI equation.     CRP   Date Value Ref Range Status   02/13/2023 13.9 (H) 0.0 - <0.8 mg/dL Final

## 2025-01-13 NOTE — TELEPHONE ENCOUNTER
Called and left voicemail detail.     Patient need a follow up appt with Dr. Gutiérrez in 5 weeks virtual. (virtual visit only on Monday).    Please schedule appt. Thanks.

## 2025-01-14 RX ORDER — METHOTREXATE 2.5 MG/1
15 TABLET ORAL
Qty: 72 TABLET | Refills: 0 | Status: SHIPPED | OUTPATIENT
Start: 2025-01-14

## 2025-02-18 ENCOUNTER — OFFICE VISIT (OUTPATIENT)
Dept: RHEUMATOLOGY | Facility: CLINIC | Age: 70
End: 2025-02-18
Payer: COMMERCIAL

## 2025-02-18 VITALS — DIASTOLIC BLOOD PRESSURE: 69 MMHG | SYSTOLIC BLOOD PRESSURE: 141 MMHG | OXYGEN SATURATION: 94 % | HEART RATE: 82 BPM

## 2025-02-18 DIAGNOSIS — Z71.89 ENCOUNTER TO DISCUSS TREATMENT OPTIONS: ICD-10-CM

## 2025-02-18 DIAGNOSIS — N30.11 INTERSTITIAL CYSTITIS (CHRONIC) WITH HEMATURIA: Primary | ICD-10-CM

## 2025-02-18 DIAGNOSIS — Z79.899 HIGH RISK MEDICATION USE: ICD-10-CM

## 2025-02-18 PROCEDURE — G2211 COMPLEX E/M VISIT ADD ON: HCPCS | Performed by: INTERNAL MEDICINE

## 2025-02-18 PROCEDURE — 99214 OFFICE O/P EST MOD 30 MIN: CPT | Performed by: INTERNAL MEDICINE

## 2025-02-18 RX ORDER — METHOTREXATE 2.5 MG/1
20 TABLET ORAL
Qty: 32 TABLET | Refills: 2 | Status: SHIPPED | OUTPATIENT
Start: 2025-02-18

## 2025-02-18 NOTE — PROGRESS NOTES
Assessment and Plan    1.  Naye is experiencing the return of her cystitis symptoms on reducing prednisone below 5 mg.  We will update her methotrexate labs, if stable she will INCREASE methotrexate dose to 20 mg weekly.    2.  For the next 6 weeks she will increase prednisone to 7.5 mg daily (if no big difference between 5 and 7.5 mg prednisone can reduce it back to 5 mg daily in early March.  However if she gets additional benefit from the 7.5 mg prednisone will stay on that dose through the end of March and reduce it to 5 mg daily in April.  Will recheck her methotrexate labs prior to her April visit    3.  She is planning a trip to [place] in May, will be seeing her urologist in March and may need some intravesicular steroid injections prior to her trip to [place].    Follow-up 2 months    CC PCP, Dr. Shaw (urology)            Rheumatology follow-up visit note       Bernice is a very nice 68-year-old lady referred by her urologist (Dr.Jodi Shaw) for debilitating, recurrent cystitis of 8-month duration.Her symptoms started in February 2024 while she was in [place], she had some dyspareunia during intercourse (dry, painful) and subsequently started noticing dysuria, bladder spasms which have been very debilitating.  She has had multiple cystoscopies, the bladder biopsies did not show malignancy.  According to the patient the most recent cystoscopy showed some ulceration of the bladder mucosa.  She was initially empirically treated with antibiotics for urinary tract infection but despite multiple antibiotic courses did not get relief.  She has almost constant daily bladder and urethral burning.  Urination helps relieve the symptoms temporarily but she has also noticed incontinence after she empties her bladder.  She had imaging studies of the abdomen/pelvis which did not show urolithiasis or bladder lesions.  There is no history of previous urologic procedures, urethral stricture or tuberculosis.  The  "antibiotics did not help her urinary symptoms but multiple courses of steroids have relieved her symptoms, however after she finishes a steroid course her symptoms return.  Her urologist has mentioned the possibility of doing intravesicular steroid injections.  She tried oxybutynin, vaginal estrogens, gabapentin and even diazepam for her bladder symptoms without relief.    Given the response of her cystitis to oral steroids, we started her on methotrexate as a \"steroid sparing \"medication.  She is tolerating methotrexate well (15mg weekly) along with daily folic acid.  We have been trying to wean her prednisone, however on reducing prednisone to 2.5 mg daily her cystitis and bladder symptoms got worse and we increase it back to 5 mg daily.  On 5 mg prednisone she is a bit better but still gets urinary burning towards the end of her micturition phase and has some lower pelvic discomfort which she did not have on higher prednisone doses.  She is tolerating methotrexate without any side effects.       She has a history of osteoarthritis commensurate with age and has had bilateral knee arthroplasties and a left hip arthroplasty but denies any inflammatory swelling of her small joints.  There is no prior history of lupus, sarcoid or ANCA vasculitis.  There is no history of nephritis, nosebleeds, recurrent sinus infections, purpura.      DETAILED EXAMINATION  02/18/25  :    Patient is alert and oriented x 3.    Head/neck: No facial asymmetry, no rash, no jaundice, no conjunctival pallor, no alopecia    Lungs clear    Heart sounds regular    Left hand.  No swelling or synovitis of the fingers, no tenderness of individual MCP, PIP and DIP knuckles    Right hand.  No swelling or synovitis of the fingers noted, no tenderness of individual MCP, PIP and DIP knuckles.    Elbows normal range of motion    Shoulders normal ROM    Hips pain-free ROM    Knees normal range of motion    Ankles no swelling or redness      Patient Active " Problem List    Diagnosis Date Noted    Cellulitis due to Staphylococcus aureus 02/14/2023     Priority: Medium    Obstructive sleep apnea on CPAP 02/11/2023     Priority: Medium    Pure hypercholesterolemia 02/11/2023     Priority: Medium    Hypokalemia 02/11/2023     Priority: Medium    Abdominal wall abscess 02/11/2023     Priority: Medium    Cellulitis of abdominal wall 02/11/2023     Priority: Medium    Fever, unspecified fever cause 02/11/2023     Priority: Medium    Leukocytosis, unspecified type 02/11/2023     Priority: Medium    Sepsis without acute organ dysfunction, due to unspecified organism (H) 02/11/2023     Priority: Medium    Anxiety and depression 01/09/2017     Priority: Medium    Arthritis of knee 10/08/2012     Priority: Medium     Past Surgical History:   Procedure Laterality Date    ARTHROPLASTY KNEE BILATERAL  10/8/2012    Procedure: ARTHROPLASTY KNEE BILATERAL;  Bilateral Total Knee Arthroplasty;  Surgeon: Nino Andre MD;  Location: [facility] OR    HYSTERECTOMY        Past Medical History:   Diagnosis Date    Arthritis     Asthma     Gastro-oesophageal reflux disease      Allergies   Allergen Reactions    Sulfa Antibiotics Hives    Tramadol Nausea    Latex Rash    Macrobid [Nitrofurantoin Anhydrous] Rash     Current Outpatient Medications   Medication Sig Dispense Refill    albuterol (PROVENTIL) (2.5 MG/3ML) 0.083% neb solution Take 2.5 mg by nebulization every 4 hours as needed for shortness of breath, wheezing or cough      cetirizine (ZYRTEC) 10 MG tablet Take 10 mg by mouth daily as needed.        clobetasol (TEMOVATE) 0.05 % GEL topical gel Apply topically daily.      COMPRESSION STOCKINGS 1 each continuous. (Patient not taking: Reported on 1/13/2025) 1 each 0    COMPRESSION STOCKINGS 1 each continuous. 1 each 0    estradiol 0.01% in vanicream - PHARMACY TO MIX - dispense with applicator Place 2 g vaginally twice a week.      fluticasone (FLONASE) 50 MCG/ACT nasal spray Spray 1 spray into  both nostrils daily as needed for rhinitis or allergies.      fluticasone-vilanterol (BREO ELLIPTA) 200-25 MCG/ACT inhaler Inhale 1 puff into the lungs daily      folic acid (FOLVITE) 1 MG tablet Take 1 tablet (1 mg) by mouth daily. 30 tablet 3    gabapentin (NEURONTIN) 400 MG capsule Take 400 mg by mouth 2 times daily.      hydrOXYzine HCl (ATARAX) 10 MG tablet Take 10 mg by mouth daily.      ipratropium - albuterol 0.5 mg/2.5 mg/3 mL (DUONEB) 0.5-2.5 (3) MG/3ML neb solution Take 1 vial by nebulization every 6 hours as needed for shortness of breath, wheezing or cough      methotrexate 2.5 MG tablet Take 6 tablets (15 mg) by mouth every 7 days. 72 tablet 0    methotrexate 2.5 MG tablet Take 6 tablets (15 mg) by mouth every 7 days. 24 tablet 1    mometasone (ELOCON) 0.1 % external cream Apply topically daily (Patient not taking: Reported on 1/13/2025)      montelukast (SINGULAIR) 10 MG tablet Take 10 mg by mouth daily      multivitamin, therapeutic (THERA-VIT) TABS tablet Take 1 tablet by mouth daily (Patient not taking: Reported on 1/13/2025)      naproxen sodium (ANAPROX) 220 MG tablet Take 440 mg by mouth 2 times daily (with meals). (Patient not taking: Reported on 1/13/2025)      OMEPRAZOLE PO Take 40 mg by mouth daily      predniSONE (DELTASONE) 5 MG tablet Take 2 tablets (10 mg) by mouth daily. 60 tablet 1    rosuvastatin (CRESTOR) 5 MG tablet Take 5 mg by mouth daily.      sertraline (ZOLOFT) 100 MG tablet Take 100 mg by mouth daily      vibegron (GEMTESA) 75 MG TABS tablet Take 75 mg by mouth daily.      VITAMIN D, CHOLECALCIFEROL, PO Take 2,000 Units by mouth daily       family history is not on file.  Social Connections: Socially Integrated (11/7/2024)    Received from [facility]    Social Connection and Isolation Panel [NHANES]     Frequency of Communication with Friends and Family: More than three times a week     Frequency of Social Gatherings with Friends and Family: More than  three times a week     Attends Mormonism Services: More than 4 times per year     Active Member of Clubs or Organizations: Yes     Attends Club or Organization Meetings: More than 4 times per year     Marital Status:           WBC Count   Date Value Ref Range Status   11/01/2024 10.9 4.0 - 11.0 10e3/uL Final     RBC Count   Date Value Ref Range Status   11/01/2024 4.66 3.80 - 5.20 10e6/uL Final     Hemoglobin   Date Value Ref Range Status   11/01/2024 14.0 11.7 - 15.7 g/dL Final   10/11/2012 9.9 (L) 11.7 - 15.7 g/dL Final     Hematocrit   Date Value Ref Range Status   11/01/2024 43.4 35.0 - 47.0 % Final     MCV   Date Value Ref Range Status   11/01/2024 93 78 - 100 fL Final     MCH   Date Value Ref Range Status   11/01/2024 30.0 26.5 - 33.0 pg Final     Platelet Count   Date Value Ref Range Status   11/01/2024 258 150 - 450 10e3/uL Final     % Lymphocytes   Date Value Ref Range Status   11/01/2024 18 % Final     AST   Date Value Ref Range Status   02/11/2023 22 0 - 40 U/L Final     ALT   Date Value Ref Range Status   11/01/2024 24 0 - 50 U/L Final     Albumin   Date Value Ref Range Status   02/11/2023 3.2 (L) 3.5 - 5.0 g/dL Final     Alkaline Phosphatase   Date Value Ref Range Status   02/11/2023 92 45 - 120 U/L Final     Creatinine   Date Value Ref Range Status   11/01/2024 0.91 0.51 - 0.95 mg/dL Final     GFR Estimate   Date Value Ref Range Status   11/01/2024 69 >60 mL/min/1.73m2 Final     Comment:     eGFR calculated using 2021 CKD-EPI equation.     CRP   Date Value Ref Range Status   02/13/2023 13.9 (H) 0.0 - <0.8 mg/dL Final

## 2025-04-21 ENCOUNTER — VIRTUAL VISIT (OUTPATIENT)
Dept: RHEUMATOLOGY | Facility: CLINIC | Age: 70
End: 2025-04-21
Payer: COMMERCIAL

## 2025-04-21 VITALS — BODY MASS INDEX: 38.89 KG/M2 | HEIGHT: 66 IN | WEIGHT: 242 LBS

## 2025-04-21 DIAGNOSIS — Z71.89 ENCOUNTER TO DISCUSS TREATMENT OPTIONS: ICD-10-CM

## 2025-04-21 DIAGNOSIS — N30.90 CYSTITIS: Primary | ICD-10-CM

## 2025-04-21 DIAGNOSIS — Z79.899 HIGH RISK MEDICATION USE: ICD-10-CM

## 2025-04-21 DIAGNOSIS — R10.2 PELVIC PAIN: ICD-10-CM

## 2025-04-21 PROCEDURE — 98005 SYNCH AUDIO-VIDEO EST LOW 20: CPT | Performed by: INTERNAL MEDICINE

## 2025-04-21 PROCEDURE — 1126F AMNT PAIN NOTED NONE PRSNT: CPT | Mod: 95 | Performed by: INTERNAL MEDICINE

## 2025-04-21 RX ORDER — FOLIC ACID 1 MG/1
1 TABLET ORAL DAILY
Qty: 90 TABLET | Refills: 3 | Status: SHIPPED | OUTPATIENT
Start: 2025-04-21

## 2025-04-21 RX ORDER — PREDNISONE 5 MG/1
5 TABLET ORAL DAILY
Qty: 90 TABLET | Refills: 1 | Status: SHIPPED | OUTPATIENT
Start: 2025-04-21

## 2025-04-21 ASSESSMENT — PAIN SCALES - GENERAL: PAINLEVEL_OUTOF10: NO PAIN (0)

## 2025-04-21 NOTE — NURSING NOTE
Current patient location: [address] [place] [id number]    Is the patient currently in the state of [place]? YES    Visit mode: VIDEO    If the visit is dropped, the patient can be reconnected by:VIDEO VISIT: Text to cell phone:   Telephone Information:   Mobile [phone]       Will anyone else be joining the visit? Yes, pt's  Jeffrey will be joining the video visit per pt   (If patient encounters technical issues they should call [phone]    Are changes needed to the allergy or medication list? Pt stated no med changes    Are refills needed on medications prescribed by this physician? Yes, Pt needs Methotrexate 2.5mg daily and would like to discuss Prednisone for vacation per pt    Rooming Documentation:  Questionnaire(s) completed    Reason for visit: RECHECK    No other vitals to report per pt    Kanwal JALLOHF

## 2025-04-21 NOTE — PROGRESS NOTES
Virtual Visit Details    Type of service:  Video Visit       Originating Location (pt. Location): Home    Distant Location (provider location): Off-site  Platform used for Video Visit: David    Direct face-to-face video time: 10:19 AM-10:31 AM                       Assessment and Plan    Continue methotrexate 20 mg weekly, prednisone 2.5 mg daily.    She is planning a trip to [place] in mid May, we will see her in follow-up on May 12, 2025 (virtual visit).    She will have methotrexate labs prior to her next visit.     CC PCP, Dr. Shaw (urology)                 Rheumatology follow-up visit note         Bernice is a very nice 68-year-old lady referred by her urologist (Dr.Jodi Shaw) for debilitating, recurrent cystitis of 8-month duration.Her symptoms started in February 2024 while she was in [place], she had some dyspareunia during intercourse (dry, painful) and subsequently started noticing dysuria, bladder spasms which have been very debilitating.  She has had multiple cystoscopies, the bladder biopsies did not show malignancy.  According to the patient the most recent cystoscopy showed some ulceration of the bladder mucosa.  She was initially empirically treated with antibiotics for urinary tract infection but despite multiple antibiotic courses did not get relief.  She has almost constant daily bladder and urethral burning.  Urination helps relieve the symptoms temporarily but she has also noticed incontinence after she empties her bladder.  She had imaging studies of the abdomen/pelvis which did not show urolithiasis or bladder lesions.  There is no history of previous urologic procedures, urethral stricture or tuberculosis.  The antibiotics did not help her urinary symptoms but multiple courses of steroids have relieved her symptoms, however after she finishes a steroid course her symptoms return.  Her urologist has mentioned the possibility of doing intravesicular steroid injections.  She tried oxybutynin,  "vaginal estrogens, gabapentin and even diazepam for her bladder symptoms without relief.     Given the response of her cystitis to oral steroids, we started her on methotrexate as a \"steroid sparing \"medication.  She is tolerating methotrexate well (15mg weekly) along with daily folic acid.  We have been trying to wean her prednisone, however on reducing prednisone to 2.5 mg daily her cystitis and bladder symptoms got worse and we increase it back to 5 mg daily.  On 5 mg prednisone she is a bit better but still gets urinary burning towards the end of her micturition phase and has some lower pelvic discomfort which she did not have on higher prednisone doses.  She is tolerating methotrexate without any side effects.    Since her last visit she had the return of her dysuria, saw urology.  Was diagnosed with a UTI (E. coli), treated with Augmentin.  However her bladder urgency persisted and she had a cystoscopy on 3/20/2025, where \"ulcers were inflamed \", she had more steroid injections inside the bladder and her symptoms are 90% better.  She is on 20 mg methotrexate weekly and prednisone 2.5 mg daily.        She has a history of osteoarthritis commensurate with age and has had bilateral knee arthroplasties and a left hip arthroplasty but denies any inflammatory swelling of her small joints.  There is no prior history of lupus, sarcoid or ANCA vasculitis.  There is no history of nephritis, nosebleeds, recurrent sinus infections, purpura.                  DETAILED EXAMINATION  04/21/25  :    Vitals:    04/21/25 0952   Weight: 109.8 kg (242 lb)   Height: 1.676 m (5' 6\")   Patient appears comfortable on the monitor.  Graph head/neck.  No facial asymmetry, patient denies headache, speech fluent.    Moving her shoulders and upper extremities without difficulty.    Due to the virtual nature of the visit a more detailed physical exam is not feasible.     Patient Active Problem List    Diagnosis Date Noted    Cellulitis due to " Staphylococcus aureus 02/14/2023     Priority: Medium    Obstructive sleep apnea on CPAP 02/11/2023     Priority: Medium    Pure hypercholesterolemia 02/11/2023     Priority: Medium    Hypokalemia 02/11/2023     Priority: Medium    Abdominal wall abscess 02/11/2023     Priority: Medium    Cellulitis of abdominal wall 02/11/2023     Priority: Medium    Fever, unspecified fever cause 02/11/2023     Priority: Medium    Leukocytosis, unspecified type 02/11/2023     Priority: Medium    Sepsis without acute organ dysfunction, due to unspecified organism (H) 02/11/2023     Priority: Medium    Anxiety and depression 01/09/2017     Priority: Medium    Arthritis of knee 10/08/2012     Priority: Medium     Past Surgical History:   Procedure Laterality Date    ARTHROPLASTY KNEE BILATERAL  10/8/2012    Procedure: ARTHROPLASTY KNEE BILATERAL;  Bilateral Total Knee Arthroplasty;  Surgeon: Nino Andre MD;  Location: [facility] OR    HYSTERECTOMY        Past Medical History:   Diagnosis Date    Arthritis     Asthma     Gastro-oesophageal reflux disease      Allergies   Allergen Reactions    Sulfa Antibiotics Hives    Tramadol Nausea    Latex Rash    Macrobid [Nitrofurantoin Anhydrous] Rash     Current Outpatient Medications   Medication Sig Dispense Refill    folic acid (FOLVITE) 1 MG tablet Take 1 tablet (1 mg) by mouth daily. 90 tablet 3    predniSONE (DELTASONE) 5 MG tablet Take 1 tablet (5 mg) by mouth daily. 90 tablet 1    albuterol (PROVENTIL) (2.5 MG/3ML) 0.083% neb solution Take 2.5 mg by nebulization every 4 hours as needed for shortness of breath, wheezing or cough      cetirizine (ZYRTEC) 10 MG tablet Take 10 mg by mouth daily as needed.        clobetasol (TEMOVATE) 0.05 % GEL topical gel Apply topically daily.      COMPRESSION STOCKINGS 1 each continuous. (Patient not taking: Reported on 1/13/2025) 1 each 0    COMPRESSION STOCKINGS 1 each continuous. 1 each 0    estradiol 0.01% in vanicream - PHARMACY TO MIX - dispense with  applicator Place 2 g vaginally twice a week.      fluticasone (FLONASE) 50 MCG/ACT nasal spray Spray 1 spray into both nostrils daily as needed for rhinitis or allergies.      fluticasone-vilanterol (BREO ELLIPTA) 200-25 MCG/ACT inhaler Inhale 1 puff into the lungs daily      folic acid (FOLVITE) 1 MG tablet Take 1 tablet (1 mg) by mouth daily. 90 tablet 0    gabapentin (NEURONTIN) 400 MG capsule Take 400 mg by mouth 2 times daily.      hydrOXYzine HCl (ATARAX) 10 MG tablet Take 10 mg by mouth daily.      ipratropium - albuterol 0.5 mg/2.5 mg/3 mL (DUONEB) 0.5-2.5 (3) MG/3ML neb solution Take 1 vial by nebulization every 6 hours as needed for shortness of breath, wheezing or cough      methotrexate 2.5 MG tablet Take 8 tablets (20 mg) by mouth every 7 days. 32 tablet 2    methotrexate 2.5 MG tablet Take 6 tablets (15 mg) by mouth every 7 days. 72 tablet 0    methotrexate 2.5 MG tablet Take 6 tablets (15 mg) by mouth every 7 days. 24 tablet 1    mometasone (ELOCON) 0.1 % external cream Apply topically daily (Patient not taking: Reported on 1/13/2025)      montelukast (SINGULAIR) 10 MG tablet Take 10 mg by mouth daily      multivitamin, therapeutic (THERA-VIT) TABS tablet Take 1 tablet by mouth daily (Patient not taking: Reported on 1/13/2025)      naproxen sodium (ANAPROX) 220 MG tablet Take 440 mg by mouth 2 times daily (with meals). (Patient not taking: Reported on 1/13/2025)      OMEPRAZOLE PO Take 40 mg by mouth daily      predniSONE (DELTASONE) 5 MG tablet Take 1.5 tablets (7.5 mg) by mouth daily. For 6 weeks. 63 tablet 0    rosuvastatin (CRESTOR) 5 MG tablet Take 5 mg by mouth daily.      sertraline (ZOLOFT) 100 MG tablet Take 100 mg by mouth daily      vibegron (GEMTESA) 75 MG TABS tablet Take 75 mg by mouth daily.      VITAMIN D, CHOLECALCIFEROL, PO Take 2,000 Units by mouth daily       family history is not on file.  Social Connections: Socially Integrated (11/7/2024)    Received from [facility]    Social Connection and Isolation Panel [NHANES]     Frequency of Communication with Friends and Family: More than three times a week     Frequency of Social Gatherings with Friends and Family: More than three times a week     Attends Denominational Services: More than 4 times per year     Active Member of Clubs or Organizations: Yes     Attends Club or Organization Meetings: More than 4 times per year     Marital Status:           WBC Count   Date Value Ref Range Status   11/01/2024 10.9 4.0 - 11.0 10e3/uL Final     RBC Count   Date Value Ref Range Status   11/01/2024 4.66 3.80 - 5.20 10e6/uL Final     Hemoglobin   Date Value Ref Range Status   11/01/2024 14.0 11.7 - 15.7 g/dL Final   10/11/2012 9.9 (L) 11.7 - 15.7 g/dL Final     Hematocrit   Date Value Ref Range Status   11/01/2024 43.4 35.0 - 47.0 % Final     MCV   Date Value Ref Range Status   11/01/2024 93 78 - 100 fL Final     MCH   Date Value Ref Range Status   11/01/2024 30.0 26.5 - 33.0 pg Final     Platelet Count   Date Value Ref Range Status   11/01/2024 258 150 - 450 10e3/uL Final     % Lymphocytes   Date Value Ref Range Status   11/01/2024 18 % Final     AST   Date Value Ref Range Status   02/11/2023 22 0 - 40 U/L Final     ALT   Date Value Ref Range Status   11/01/2024 24 0 - 50 U/L Final     Albumin   Date Value Ref Range Status   02/11/2023 3.2 (L) 3.5 - 5.0 g/dL Final     Alkaline Phosphatase   Date Value Ref Range Status   02/11/2023 92 45 - 120 U/L Final     Creatinine   Date Value Ref Range Status   11/01/2024 0.91 0.51 - 0.95 mg/dL Final     GFR Estimate   Date Value Ref Range Status   11/01/2024 69 >60 mL/min/1.73m2 Final     Comment:     eGFR calculated using 2021 CKD-EPI equation.     CRP   Date Value Ref Range Status   02/13/2023 13.9 (H) 0.0 - <0.8 mg/dL Final

## 2025-05-12 ENCOUNTER — VIRTUAL VISIT (OUTPATIENT)
Dept: RHEUMATOLOGY | Facility: CLINIC | Age: 70
End: 2025-05-12
Payer: COMMERCIAL

## 2025-05-12 VITALS — WEIGHT: 238 LBS | HEIGHT: 66 IN | BODY MASS INDEX: 38.25 KG/M2

## 2025-05-12 DIAGNOSIS — R10.2 PELVIC PAIN: ICD-10-CM

## 2025-05-12 DIAGNOSIS — R74.01 ALT (SGPT) LEVEL RAISED: ICD-10-CM

## 2025-05-12 DIAGNOSIS — N30.11 INTERSTITIAL CYSTITIS (CHRONIC) WITH HEMATURIA: Primary | ICD-10-CM

## 2025-05-12 DIAGNOSIS — Z71.89 ENCOUNTER TO DISCUSS TREATMENT OPTIONS: ICD-10-CM

## 2025-05-12 PROCEDURE — 98006 SYNCH AUDIO-VIDEO EST MOD 30: CPT | Performed by: INTERNAL MEDICINE

## 2025-05-12 PROCEDURE — 1126F AMNT PAIN NOTED NONE PRSNT: CPT | Mod: 95 | Performed by: INTERNAL MEDICINE

## 2025-05-12 ASSESSMENT — PAIN SCALES - GENERAL: PAINLEVEL_OUTOF10: NO PAIN (0)

## 2025-05-12 NOTE — PROGRESS NOTES
"Virtual Visit Details    Type of service:  Video Visit       Originating Location (pt. Location): Home    Distant Location (provider location):  On-site  Platform used for Video Visit: David    Face-to-face video time: 11:31 AM-11:45 AM      Assessment and Plan    Bernice had another E. coli urinary tract infection and is on Augmentin.  She has been on methotrexate 6 months with no lessening of her urinary symptoms.  At this point it would be reasonable to conclude that her urinary symptoms/cystitis are primarily due to recurrent urinary infections, rather than an independent \"inflammatory process \".  Her recent labs show her ALT also went up to 101.  At this point I think we would like her to phase out methotrexate.    Labs: WBC 8.7, hemoglobin 13.5, platelets 218, ALT elevated (101), creatinine 0.72    She will reduce methotrexate to 10 mg weekly for the next month, once she returns from her trip to [place] she will stop methotrexate.  The low-dose prednisone (2.5 mg) can also be stopped once she returns from [place].    Due to her repeated urinary tract infections, likely due to a combination of genitourinary anatomic factors, she may be a good candidate for \"long term antibiotic suppressive therapy \"i.e. nitrofurantoin etc.  This she will discuss with her PCP, input from infectious disease may also be considered ,if her PCP so desires.    Plan:    1.  Reduce methotrexate to 10 mg weekly for a month and then stop.    2.  Stop prednisone (2.5 mg) after returning from her trip to [facility]    Follow-up .  To make sure her ALT returns to normal we will check her ALT along with CBC and creatinine prior to her next visit.    Follow-up July 14, 2025 (virtual visit) in 2 months    Total time spent 34 minutes including review of records, in face-to-face communication and documentation.    CC Hue Sheppard MD, Dr. Shaw (urology)            Rheumatology follow-up visit note         NEWTON Queen is a very nice " "68-year-old lady referred by her urologist (Dr.Jodi Shaw) for debilitating, recurrent cystitis of 8-month duration.Her symptoms started in February 2024 while she was in [place], she had some dyspareunia during intercourse (dry, painful) and subsequently started noticing dysuria, bladder spasms which have been very debilitating.  She has had multiple cystoscopies, the bladder biopsies did not show malignancy.  According to the patient the most recent cystoscopy showed some ulceration of the bladder mucosa.  She was initially empirically treated with antibiotics for urinary tract infection but despite multiple antibiotic courses did not get relief.  She has almost constant daily bladder and urethral burning.  Urination helps relieve the symptoms temporarily but she has also noticed incontinence after she empties her bladder.  She had imaging studies of the abdomen/pelvis which did not show urolithiasis or bladder lesions.  There is no history of previous urologic procedures, urethral stricture or tuberculosis.  The antibiotics did not help her urinary symptoms but multiple courses of steroids have relieved her symptoms, however after she finishes a steroid course her symptoms return.  Her urologist has mentioned the possibility of doing intravesicular steroid injections.  She tried oxybutynin, vaginal estrogens, gabapentin and even diazepam for her bladder symptoms without relief.     Given the response of her cystitis to oral steroids, we started her on methotrexate as a \"steroid sparing \"medication.  She is tolerating methotrexate well (15mg weekly) along with daily folic acid.  We have been trying to wean her prednisone, however on reducing prednisone to 2.5 mg daily her cystitis and bladder symptoms got worse and we increase it back to 5 mg daily.  On 5 mg prednisone she is a bit better but still gets urinary burning towards the end of her micturition phase and has some lower pelvic discomfort which she did " "not have on higher prednisone doses.  She is tolerating methotrexate without any side effects.     Since her last visit she had the return of her dysuria, saw urology.  Was diagnosed with a UTI (E. coli), treated with Augmentin.  However her bladder urgency persisted and she had a cystoscopy on 3/20/2025, where \"ulcers were inflamed \", she had more steroid injections inside the bladder and her symptoms are 90% better.  She is on 20 mg methotrexate weekly and prednisone 2.5 mg daily.        She has had bilateral knee arthroplasties and a left hip arthroplasty but denies any inflammatory swelling of her small joints.  There is no prior history of lupus, sarcoid or ANCA vasculitis.  There is no history of nephritis, nosebleeds, recurrent sinus infections, purpura.        DETAILED EXAMINATION  05/12/25  :    Vitals:    05/12/25 1054   Weight: 108 kg (238 lb)   Height: 1.676 m (5' 6\")   The patient is alert and oriented on the monitor.  Speech is fluent.     Patient Active Problem List    Diagnosis Date Noted    Cellulitis due to Staphylococcus aureus 02/14/2023     Priority: Medium    Obstructive sleep apnea on CPAP 02/11/2023     Priority: Medium    Pure hypercholesterolemia 02/11/2023     Priority: Medium    Hypokalemia 02/11/2023     Priority: Medium    Abdominal wall abscess 02/11/2023     Priority: Medium    Cellulitis of abdominal wall 02/11/2023     Priority: Medium    Fever, unspecified fever cause 02/11/2023     Priority: Medium    Leukocytosis, unspecified type 02/11/2023     Priority: Medium    Sepsis without acute organ dysfunction, due to unspecified organism (H) 02/11/2023     Priority: Medium    Anxiety and depression 01/09/2017     Priority: Medium    Arthritis of knee 10/08/2012     Priority: Medium     Past Surgical History:   Procedure Laterality Date    ARTHROPLASTY KNEE BILATERAL  10/8/2012    Procedure: ARTHROPLASTY KNEE BILATERAL;  Bilateral Total Knee Arthroplasty;  Surgeon: Nino Andre MD;  " Location: [facility] OR    HYSTERECTOMY        Past Medical History:   Diagnosis Date    Arthritis     Asthma     Gastro-oesophageal reflux disease      Allergies   Allergen Reactions    Sulfa Antibiotics Hives    Tramadol Nausea    Latex Rash    Macrobid [Nitrofurantoin Anhydrous] Rash     Current Outpatient Medications   Medication Sig Dispense Refill    albuterol (PROVENTIL) (2.5 MG/3ML) 0.083% neb solution Take 2.5 mg by nebulization every 4 hours as needed for shortness of breath, wheezing or cough      cetirizine (ZYRTEC) 10 MG tablet Take 10 mg by mouth daily as needed.        clobetasol (TEMOVATE) 0.05 % GEL topical gel Apply topically daily.      COMPRESSION STOCKINGS 1 each continuous. (Patient not taking: Reported on 1/13/2025) 1 each 0    COMPRESSION STOCKINGS 1 each continuous. 1 each 0    estradiol 0.01% in vanicream - PHARMACY TO MIX - dispense with applicator Place 2 g vaginally twice a week.      fluticasone (FLONASE) 50 MCG/ACT nasal spray Spray 1 spray into both nostrils daily as needed for rhinitis or allergies.      fluticasone-vilanterol (BREO ELLIPTA) 200-25 MCG/ACT inhaler Inhale 1 puff into the lungs daily      folic acid (FOLVITE) 1 MG tablet Take 1 tablet (1 mg) by mouth daily. 90 tablet 3    folic acid (FOLVITE) 1 MG tablet Take 1 tablet (1 mg) by mouth daily. 90 tablet 0    gabapentin (NEURONTIN) 400 MG capsule Take 400 mg by mouth 2 times daily.      hydrOXYzine HCl (ATARAX) 10 MG tablet Take 10 mg by mouth daily.      ipratropium - albuterol 0.5 mg/2.5 mg/3 mL (DUONEB) 0.5-2.5 (3) MG/3ML neb solution Take 1 vial by nebulization every 6 hours as needed for shortness of breath, wheezing or cough      methotrexate 2.5 MG tablet Take 8 tablets (20 mg) by mouth every 7 days. 32 tablet 2    methotrexate 2.5 MG tablet Take 6 tablets (15 mg) by mouth every 7 days. 72 tablet 0    methotrexate 2.5 MG tablet Take 6 tablets (15 mg) by mouth every 7 days. 24 tablet 1    mometasone (ELOCON) 0.1 % external  cream Apply topically daily (Patient not taking: Reported on 1/13/2025)      montelukast (SINGULAIR) 10 MG tablet Take 10 mg by mouth daily      multivitamin, therapeutic (THERA-VIT) TABS tablet Take 1 tablet by mouth daily (Patient not taking: Reported on 1/13/2025)      naproxen sodium (ANAPROX) 220 MG tablet Take 440 mg by mouth 2 times daily (with meals). (Patient not taking: Reported on 1/13/2025)      OMEPRAZOLE PO Take 40 mg by mouth daily      predniSONE (DELTASONE) 5 MG tablet Take 1 tablet (5 mg) by mouth daily. 90 tablet 1    predniSONE (DELTASONE) 5 MG tablet Take 1.5 tablets (7.5 mg) by mouth daily. For 6 weeks. 63 tablet 0    rosuvastatin (CRESTOR) 5 MG tablet Take 5 mg by mouth daily.      sertraline (ZOLOFT) 100 MG tablet Take 100 mg by mouth daily      vibegron (GEMTESA) 75 MG TABS tablet Take 75 mg by mouth daily.      VITAMIN D, CHOLECALCIFEROL, PO Take 2,000 Units by mouth daily       family history is not on file.  Social Connections: Socially Integrated (11/7/2024)    Received from [facility]    Social Connection and Isolation Panel [NHANES]     Frequency of Communication with Friends and Family: More than three times a week     Frequency of Social Gatherings with Friends and Family: More than three times a week     Attends Confucianist Services: More than 4 times per year     Active Member of Clubs or Organizations: Yes     Attends Club or Organization Meetings: More than 4 times per year     Marital Status:           WBC Count   Date Value Ref Range Status   11/01/2024 10.9 4.0 - 11.0 10e3/uL Final     RBC Count   Date Value Ref Range Status   11/01/2024 4.66 3.80 - 5.20 10e6/uL Final     Hemoglobin   Date Value Ref Range Status   11/01/2024 14.0 11.7 - 15.7 g/dL Final   10/11/2012 9.9 (L) 11.7 - 15.7 g/dL Final     Hematocrit   Date Value Ref Range Status   11/01/2024 43.4 35.0 - 47.0 % Final     MCV   Date Value Ref Range Status   11/01/2024 93 78 - 100 fL Final      MCH   Date Value Ref Range Status   11/01/2024 30.0 26.5 - 33.0 pg Final     Platelet Count   Date Value Ref Range Status   11/01/2024 258 150 - 450 10e3/uL Final     % Lymphocytes   Date Value Ref Range Status   11/01/2024 18 % Final     AST   Date Value Ref Range Status   02/11/2023 22 0 - 40 U/L Final     ALT   Date Value Ref Range Status   11/01/2024 24 0 - 50 U/L Final     Albumin   Date Value Ref Range Status   02/11/2023 3.2 (L) 3.5 - 5.0 g/dL Final     Alkaline Phosphatase   Date Value Ref Range Status   02/11/2023 92 45 - 120 U/L Final     Creatinine   Date Value Ref Range Status   11/01/2024 0.91 0.51 - 0.95 mg/dL Final     GFR Estimate   Date Value Ref Range Status   11/01/2024 69 >60 mL/min/1.73m2 Final     Comment:     eGFR calculated using 2021 CKD-EPI equation.     CRP   Date Value Ref Range Status   02/13/2023 13.9 (H) 0.0 - <0.8 mg/dL Final

## 2025-05-12 NOTE — NURSING NOTE
Current patient location: [address] [place] [id number]    Is the patient currently in the state of [place]? YES    Visit mode: mychart    If the visit is dropped, the patient can be reconnected by:mychart    Will anyone else be joining the visit? NO  (If patient encounters technical issues they should call [phone]    Are changes needed to the allergy or medication list? Yes pt said that her Brio is not on there     Are refills needed on medications prescribed by this physician? NO    Rooming Documentation:  Questionnaire(s) completed    Reason for visit: RECHECK    Mandy JALLOHF

## 2025-05-12 NOTE — PROGRESS NOTES
"Virtual Visit Details    Type of service:  Video Visit   Video Start Time: {video visit start/end time for provider to select:577119}  Video End Time:{video visit start/end time for provider to select:494771}    Originating Location (pt. Location): {video visit patient location:388039::\"Home\"}  {PROVIDER LOCATION On-site should be selected for visits conducted from your clinic location or adjoining [facility] hospital, academic office, or other nearby [facility] building. Off-site should be selected for all other provider locations, including home:528367}  Distant Location (provider location):  {virtual location provider:783131}  Platform used for Video Visit: {Virtual Visit Platforms:593486::\"Bandwidth\"}  "

## 2025-07-02 ENCOUNTER — TELEPHONE (OUTPATIENT)
Dept: RHEUMATOLOGY | Facility: CLINIC | Age: 70
End: 2025-07-02
Payer: COMMERCIAL

## 2025-07-02 NOTE — TELEPHONE ENCOUNTER
Health Call Center    Phone Message    May a detailed message be left on voicemail: yes     Reason for Call: Other: Priti Beal NP from Family Medicine is calling to see if Dr Gutiérrez would like labs for pt prior to upcoming appointment with Dr Gutiérrez on 7/14. Please fax labs to fax: [phone].     If there is any questions call ph: [phone] nurse extension [phone]    Action Taken: Message routed to:  Other: [facility]    Travel Screening: Not Applicable     Date of Service: 7/2

## 2025-07-10 ENCOUNTER — TELEPHONE (OUTPATIENT)
Dept: RHEUMATOLOGY | Facility: CLINIC | Age: 70
End: 2025-07-10
Payer: COMMERCIAL

## 2025-07-10 NOTE — TELEPHONE ENCOUNTER
[facility]    Phone Message    May a detailed message be left on voicemail: yes     Reason for Call: Order(s): Other:   Reason for requested: labs for before appt to her home care clinic Per TE and Patient    Dr. Gomez on 7/14/25 for a video chat.  Could you please fax lab orders to:  [facility]  Fax # [phone]  Date needed: 7/10/25  Provider name: Fran Gutiérrez MD     Action Taken: Message routed to:  Other: rheum    Travel Screening: Not Applicable     Date of Service: 7/10/25

## 2025-07-14 ENCOUNTER — VIRTUAL VISIT (OUTPATIENT)
Dept: RHEUMATOLOGY | Facility: CLINIC | Age: 70
End: 2025-07-14
Payer: COMMERCIAL

## 2025-07-14 VITALS — BODY MASS INDEX: 38.25 KG/M2 | WEIGHT: 238 LBS | HEIGHT: 66 IN

## 2025-07-14 DIAGNOSIS — Z71.89 ENCOUNTER TO DISCUSS TREATMENT OPTIONS: ICD-10-CM

## 2025-07-14 DIAGNOSIS — N30.11 INTERSTITIAL CYSTITIS (CHRONIC) WITH HEMATURIA: Primary | ICD-10-CM

## 2025-07-14 DIAGNOSIS — R10.2 PELVIC PAIN: ICD-10-CM

## 2025-07-14 PROCEDURE — 98005 SYNCH AUDIO-VIDEO EST LOW 20: CPT | Performed by: INTERNAL MEDICINE

## 2025-07-14 PROCEDURE — 1125F AMNT PAIN NOTED PAIN PRSNT: CPT | Mod: 95 | Performed by: INTERNAL MEDICINE

## 2025-07-14 ASSESSMENT — PAIN SCALES - GENERAL: PAINLEVEL_OUTOF10: SEVERE PAIN (9)

## 2025-07-14 NOTE — NURSING NOTE
Current patient location: [address] [place] [id number]    Is the patient currently in the state of [place]? YES    Visit mode: VIDEO    If the visit is dropped, the patient can be reconnected by:VIDEO VISIT: Text to cell phone:   Telephone Information:   Mobile [phone]       Will anyone else be joining the visit? NO  (If patient encounters technical issues they should call [phone])    Are changes needed to the allergy or medication list? Pt stated no changes to allergies and Pt stated no med changes    Are refills needed on medications prescribed by this physician? NO    Rooming Documentation:  Not applicable    Reason for visit: JULIUS SELBY

## 2025-07-14 NOTE — PROGRESS NOTES
Virtual Visit Details    Type of service:  Video Visit   Face-to-face video time: 10:05 AM-10:10 AM    Originating Location (pt. Location): Home    Distant Location (provider location):  Off-site  Platform used for Video Visit: [facility]      Assessment and Plan    1.  Bladder spasms    2.  Pelvic pain    3.  Interstitial cystitis.    She got no significant benefit from methotrexate as far as her bladder spasms were concerned and therefore we have stopped methotrexate.  She has had documented urinary tract infections (E. coli) over the past 6 months but continues to have bladder spasms and pelvic pain even when the urinary tract infection cannot be documented.    She will discuss with her urologist about going on nitrofurantoin suppressive therapy for a few months to see if it helps her bladder symptoms, other options like sacral nerve stimulation may also be worth considering given the debilitating nature of her bladder symptoms and the lack of response to medical treatments    Follow-up September 15, 2025 (Monday) via virtual appointment.    Total time spent 23 minutes including review of records, in face-to-face communication and documentation of the visit.    CC PCP, Dr. Shaw            Rheumatology follow-up visit note         NEWTON Queen is a very nice 69-year-old lady referred by her urologist (Dr.Jodi Shaw) for debilitating, recurrent cystitis of 8-month duration in the fall 2024.Her symptoms started in February 2024 while she was in [place], she had some dyspareunia during intercourse (dry, painful) and subsequently started noticing dysuria, bladder spasms which have been very debilitating.  She has had multiple cystoscopies, the bladder biopsies did not show malignancy.  Cystoscopy showed some ulceration of the bladder mucosa.  She was initially empirically treated with antibiotics for urinary tract infection but despite multiple antibiotic courses did not get relief.  She has almost constant daily  "bladder and urethral burning.  Urination helped relieve the symptoms temporarily . She had imaging studies of the abdomen/pelvis which did not show urolithiasis or bladder lesions.  There is no history of previous urologic procedures, urethral stricture or tuberculosis.  The antibiotics did not help her urinary symptoms but multiple courses of steroids have relieved her symptoms, however after she finishes a steroid course her symptoms return.  Her urologist has mentioned the possibility of doing intravesicular steroid injections.  She tried oxybutynin, vaginal estrogens, gabapentin and even diazepam for her bladder symptoms without relief.     Given the response of her cystitis to oral steroids, we started her on methotrexate as a \"steroid sparing \"medication.  However after more than 6 months of being on methotrexate she was still having difficulty weaning prednisone and we therefore discontinued methotrexate.  She is off prednisone.  She is still having bladder spasms and had a cystoscopy last week which showed some \"nodules \"in the bladder.  A CT scan of the abdomen and pelvis has been scheduled.  She was given cephalexin for 2 weeks but did not notice improvement.  She was treated for E. coli UTI prior to her trip to [place].        She has had bilateral knee arthroplasties and a left hip arthroplasty but denies any inflammatory swelling of her small joints.  There is no prior history of lupus, sarcoid or ANCA vasculitis.  There is no history of nephritis, nosebleeds, recurrent sinus infections, purpura.       DETAILED EXAMINATION  07/17/25  :    Vitals:    07/14/25 0932   Weight: 108 kg (238 lb)   Height: 1.676 m (5' 6\")   Entire visit spent in discussion.today    Patient Active Problem List    Diagnosis Date Noted    Cellulitis due to Staphylococcus aureus 02/14/2023     Priority: Medium    Obstructive sleep apnea on CPAP 02/11/2023     Priority: Medium    Pure hypercholesterolemia 02/11/2023     Priority: " Medium    Hypokalemia 02/11/2023     Priority: Medium    Abdominal wall abscess 02/11/2023     Priority: Medium    Cellulitis of abdominal wall 02/11/2023     Priority: Medium    Fever, unspecified fever cause 02/11/2023     Priority: Medium    Leukocytosis, unspecified type 02/11/2023     Priority: Medium    Sepsis without acute organ dysfunction, due to unspecified organism (H) 02/11/2023     Priority: Medium    Anxiety and depression 01/09/2017     Priority: Medium    Arthritis of knee 10/08/2012     Priority: Medium     Past Surgical History:   Procedure Laterality Date    ARTHROPLASTY KNEE BILATERAL  10/8/2012    Procedure: ARTHROPLASTY KNEE BILATERAL;  Bilateral Total Knee Arthroplasty;  Surgeon: Nino Andre MD;  Location: [facility]    HYSTERECTOMY        Past Medical History:   Diagnosis Date    Arthritis     Asthma     Gastro-oesophageal reflux disease      Allergies   Allergen Reactions    Sulfa Antibiotics Hives    Tramadol Nausea    Latex Rash    Macrobid [Nitrofurantoin Anhydrous] Rash     Current Outpatient Medications   Medication Sig Dispense Refill    albuterol (PROVENTIL) (2.5 MG/3ML) 0.083% neb solution Take 2.5 mg by nebulization every 4 hours as needed for shortness of breath, wheezing or cough      cetirizine (ZYRTEC) 10 MG tablet Take 10 mg by mouth daily as needed.        clobetasol (TEMOVATE) 0.05 % GEL topical gel Apply topically daily.      COMPRESSION STOCKINGS 1 each continuous. (Patient not taking: Reported on 1/13/2025) 1 each 0    COMPRESSION STOCKINGS 1 each continuous. 1 each 0    estradiol 0.01% in vanicream - PHARMACY TO MIX - dispense with applicator Place 2 g vaginally twice a week.      fluticasone (FLONASE) 50 MCG/ACT nasal spray Spray 1 spray into both nostrils daily as needed for rhinitis or allergies.      fluticasone-vilanterol (BREO ELLIPTA) 200-25 MCG/ACT inhaler Inhale 1 puff into the lungs daily      folic acid (FOLVITE) 1 MG tablet Take 1 tablet (1 mg) by mouth daily.  90 tablet 3    folic acid (FOLVITE) 1 MG tablet Take 1 tablet (1 mg) by mouth daily. 90 tablet 0    gabapentin (NEURONTIN) 400 MG capsule Take 400 mg by mouth 2 times daily.      hydrOXYzine HCl (ATARAX) 10 MG tablet Take 10 mg by mouth daily.      ipratropium - albuterol 0.5 mg/2.5 mg/3 mL (DUONEB) 0.5-2.5 (3) MG/3ML neb solution Take 1 vial by nebulization every 6 hours as needed for shortness of breath, wheezing or cough      methotrexate 2.5 MG tablet Take 8 tablets (20 mg) by mouth every 7 days. 32 tablet 2    methotrexate 2.5 MG tablet Take 6 tablets (15 mg) by mouth every 7 days. 72 tablet 0    methotrexate 2.5 MG tablet Take 6 tablets (15 mg) by mouth every 7 days. 24 tablet 1    mometasone (ELOCON) 0.1 % external cream Apply topically daily (Patient not taking: Reported on 1/13/2025)      montelukast (SINGULAIR) 10 MG tablet Take 10 mg by mouth daily      multivitamin, therapeutic (THERA-VIT) TABS tablet Take 1 tablet by mouth daily (Patient not taking: Reported on 1/13/2025)      naproxen sodium (ANAPROX) 220 MG tablet Take 440 mg by mouth 2 times daily (with meals). (Patient not taking: Reported on 1/13/2025)      OMEPRAZOLE PO Take 40 mg by mouth daily      predniSONE (DELTASONE) 5 MG tablet Take 1 tablet (5 mg) by mouth daily. 90 tablet 1    predniSONE (DELTASONE) 5 MG tablet Take 1.5 tablets (7.5 mg) by mouth daily. For 6 weeks. 63 tablet 0    rosuvastatin (CRESTOR) 5 MG tablet Take 5 mg by mouth daily.      sertraline (ZOLOFT) 100 MG tablet Take 100 mg by mouth daily      vibegron (GEMTESA) 75 MG TABS tablet Take 75 mg by mouth daily.      VITAMIN D, CHOLECALCIFEROL, PO Take 2,000 Units by mouth daily       family history is not on file.  Social Connections: Socially Integrated (11/7/2024)    [facility]    Social Connection and Isolation Panel [NHANES]     Frequency of Communication with Friends and Family: More than three times a week     Frequency of Social  Gatherings with Friends and Family: More than three times a week     Attends [employer] Services: More than 4 times per year     Active Member of Clubs or Organizations: Yes     Attends Club or Organization Meetings: More than 4 times per year     Marital Status:           WBC Count   Date Value Ref Range Status   11/01/2024 10.9 4.0 - 11.0 10e3/uL Final     RBC Count   Date Value Ref Range Status   11/01/2024 4.66 3.80 - 5.20 10e6/uL Final     Hemoglobin   Date Value Ref Range Status   11/01/2024 14.0 11.7 - 15.7 g/dL Final   10/11/2012 9.9 (L) 11.7 - 15.7 g/dL Final     Hematocrit   Date Value Ref Range Status   11/01/2024 43.4 35.0 - 47.0 % Final     MCV   Date Value Ref Range Status   11/01/2024 93 78 - 100 fL Final     MCH   Date Value Ref Range Status   11/01/2024 30.0 26.5 - 33.0 pg Final     Platelet Count   Date Value Ref Range Status   11/01/2024 258 150 - 450 10e3/uL Final     % Lymphocytes   Date Value Ref Range Status   11/01/2024 18 % Final     AST   Date Value Ref Range Status   02/11/2023 22 0 - 40 U/L Final     ALT   Date Value Ref Range Status   11/01/2024 24 0 - 50 U/L Final     Albumin   Date Value Ref Range Status   02/11/2023 3.2 (L) 3.5 - 5.0 g/dL Final     Alkaline Phosphatase   Date Value Ref Range Status   02/11/2023 92 45 - 120 U/L Final     Creatinine   Date Value Ref Range Status   11/01/2024 0.91 0.51 - 0.95 mg/dL Final     GFR Estimate   Date Value Ref Range Status   11/01/2024 69 >60 mL/min/1.73m2 Final     Comment:     eGFR calculated using 2021 CKD-EPI equation.     CRP   Date Value Ref Range Status   02/13/2023 13.9 (H) 0.0 - <0.8 mg/dL Final

## 2025-07-18 RX ORDER — METHOCARBAMOL 500 MG/1
500 TABLET, FILM COATED ORAL 3 TIMES DAILY PRN
COMMUNITY
Start: 2025-07-17 | End: 2025-08-16

## 2025-07-18 RX ORDER — SOLIFENACIN SUCCINATE 10 MG/1
10 TABLET, FILM COATED ORAL DAILY
COMMUNITY
Start: 2025-07-14

## 2025-07-18 RX ORDER — LEVOFLOXACIN 750 MG/1
750 TABLET, FILM COATED ORAL DAILY
Status: ON HOLD | COMMUNITY
Start: 2025-07-17 | End: 2025-07-22

## 2025-07-18 NOTE — H&P (VIEW-ONLY)
PREOPERATIVE ASSESSMENT    Fax number:  [phone]  History of Present Illness  Bernice is here today for a preoperative consultation to evaluate the patient's perioperative risk prior to surgery.     Condition requiring surgery: Insterstitial Cystitis     Date of Surgery: 7/21/2025   Facility: [facility]  [address] [facility]  Surgeon: Dr. Rufina Shaw  Procedure: Cystoscopy with biopsy and fulguration  Subjective   Do you ever have any pain or discomfort in your chest? no  Do you have swelling in your feet or ankles at times? no  Are you troubled by shortness of breath when:       Walking on the level? no       Walking up flight of stairs or slight hill? no       Sleeping at night? no  Do you sometimes get pains in the calves of your legs when you walk? no  Does your chest ever sound wheezy or whistling? no  Do you currently or in the last 2 weeks, have you had, a cold, bronchitis or other respiratory infection? no  Do you usually have a cough? no  Do you or does anyone in your family have a bleeding or clotting problem? no  Have you taken any aspirin, other blood thinners, or arthritis medicine in the last 2 weeks? no  Have you ever had problems with anemia or been told to take iron pills? no  Have you had any black, tarry or bloody stools, (for women) abnormal vaginal bleeding? no  Have you or any of your relatives ever had problems with anesthesia? no  For Women: When was your last period? No LMP recorded. Patient has had a hysterectomy.  Surgical Risk Factors:  Functional Capacity: * Patient can walk up a flight of steps or a hill (4 METs)   Bernice does not have a history of heart disease.  Cardiovascular Risk Factors: age, post-menopausal, and overweight/obesity  - Three or more risk factors - 5.4 percent (95% CI 2.8-7.9 percent)  She has history of lung disease: asthma.    Patient Active Problem List   Diagnosis     Obstructive sleep apnea on CPAP     Multinodular goiter     Pure  hypercholesterolemia     GERD (gastroesophageal reflux disease)     Moderate persistent asthma without complication (HCC)     BMI 40.0-44.9, adult (HCC)     Steatohepatitis, non-alcoholic     Prediabetes     Rosacea     Hyperopia of both eyes with regular astigmatism and presbyopia     Nuclear sclerotic cataract of both eyes     Peripheral polyneuropathy     Recurrent major depressive disorder, in full remission     Dermatochalasis of both upper eyelids     Body mass index (BMI) 40.0-44.9, adult (HCC)     Osteoarthritis of left hip     Pelvic pain     Urge incontinence of urine     Idiopathic urticaria     Osteoarthrosis     Vitamin D deficiency     Hiatal hernia     Chronic ulcerating interstitial cystitis     Vaginal atrophy     Elevated blood pressure reading     Past Medical History:   Diagnosis Date     Augustine's esophagus 1999    With all all subsequent EGDs and esophageal biopsies being normal since then.     Cellulitis of left abdominal wall 03/02/2023     GERD (gastroesophageal reflux disease)      Hiatal hernia      History of bone density study     8/18/2017 with T score of spine -0.6; left femur neck 0.1, total left femur 0.7; right femur neck -0.3, total right femur 0.7 with drop in bone density as compared to 2002     Hoarseness     following intubation     Hypermetropia of right eye      Idiopathic urticaria      Interstitial cystitis      Major depression in partial remission      Migraine headache      Moderate persistent asthma without complication (HCC)      Multinodular goiter      Multiple pulmonary nodules      Nuclear sclerotic cataract of both eyes      Obesity (BMI 30-39.9)      Obstructive sleep apnea on CPAP      Osteoarthritis, multiple sites      Overactive bladder      Posterior vitreous detachment      Prediabetes      Pure hypercholesterolemia      Recurrent urinary tract infection      Regular astigmatism of both eyes      Rosacea      Seasonal allergies      Steatohepatitis,  "non-alcoholic     Per CT of 7-     Bland-neck deformity of finger     Left small finger/pinky finger.     Urge and stress incontinence      Vitamin D deficiency      Past Surgical History:   Procedure Laterality Date     ANTERIOR VESICOURETHROPEXY OR URETHROPEXY; SIMPLE N/A 11/2000    Retropubic urethropexy.     ARTHROPLASTY, ACETABULAR & PROXIMAL FEMORAL PROSTHETIC REPLCMNT, W/ OR W/OUT AUTOGRAFT OR ALLOGRAFT Left 11/30/2023    Procedure: Left Total Hip Arthroplasty,  Direct Anterior Approach, Devin;  Surgeon: Lon Briscoe MD;  Location: [facility];  Service: Orthopedics     ARTHROPLASTY, KNEE, BILATERAL Bilateral      COLONOSCOPY N/A 08/27/2009 8/27/2009: 3 mm \"lymphoid aggregate\" and internal hemorrhoids.  Normal.     COLONOSCOPY N/A 11/21/2019    Normal.     D & C       HYSTERECTOMY       TENDON REPAIR, FINGER Left 2015    Bland neck finger repair left pinky.     TOTAL ABDOMINAL HYSTERECTOMY N/A 11/2000     UGI ENDOSCOPY; W/BIOPSY, SINGLE OR MULTIPLE N/A 12/06/2018    multiple     WISDOM TEETH EXTRACTION       Current Outpatient Medications   Medication Sig     albuterol sulfate (PROVENTIL,VENTOLIN,PROAIR) 90 mcg/actuation inhalation HFA Aerosol Inhaler 2 Puffs by inhalation route as needed.     cetirizine (ZYRTEC) 10 mg oral Tablet Take 1 Tablet (10 mg) by mouth in the morning.     cholecalciferol (AKA: VITAMIN D3) 1,000 unit oral Tablet Take 2 Tablets (2,000 Units) by mouth in the morning.     clobetasoL (TEMOVATE) 0.05 % topical Ointment Apply to the affected area(s) in the morning and in the evening. Apply to affected area.     DURABLE MEDICAL EQUIPMENT, OUTSIDE VENDOR, AMBULATORY ONLY, Nebulizer  Frequency - 4-6 times per day     estradioL (ESTRACE) 0.01 % (0.1 mg/gram) vaginal Cream 1 g by vaginal route three times per week.     fluticasone furoate-vilanteroL (BREO ELLIPTA) 200-25 mcg/dose inhalation Disk with Device 1 Inhalation by inhalation route in the morning.     gabapentin " (NEURONTIN) 400 mg oral Capsule Take 1 Capsule (400 mg) by mouth in the morning and 1 Capsule (400 mg) in the evening.     hydrOXYzine HCL (ATARAX) 10 mg oral Tablet Take 1 Tablet (10 mg) by mouth at bedtime.     ipratropium (ATROVENT) 0.02 % inhalation Solution INHALE 1 VIAL BY INHALATION-NEBULIZER ROUTE EVERY 4-6 HOURS AS NEEDED     ipratropium-albuteroL (DUO-NEB) 0.5 mg-3 mg(2.5 mg base)/3 mL inhalation Solution for Nebulization 3 mL by inhalation-neb route as needed in the morning and 3 mL as needed at noon and 3 mL as needed in the evening and 3 mL as needed before bedtime for shortness of breath.     levoFLOXacin (LEVAQUIN) 750 mg oral Tablet Take 1 Tablet (750 mg) by mouth in the morning for 7 days.     methocarbamoL (ROBAXIN) 500 mg oral Tablet Take 1 Tablet (500 mg) by mouth every 8 hours as needed for muscle spasms.     metroNIDAZOLE (METROCREAM) 0.75 % topical Cream Apply to the affected area(s) in the morning and in the evening. Apply topically to face for rosacea.     montelukast (SINGULAIR) 10 mg oral Tablet Take 1 Tablet (10 mg) by mouth in the morning.     naproxen sodium (ALEVE) 220 mg oral Tablet Take 2 Tablets (440 mg) by mouth in the morning and 2 Tablets (440 mg) in the evening. Take with meals.     nystatin-triamcinolone (MYCOLOG II) 100,000-0.1 unit/g-% topical Cream      omeprazole (PRILOSEC) 40 mg oral Capsule, Delayed Release(E.C.) Take 1 Capsule (40 mg) by mouth in the morning.     polyethylene glycol 3350 (MIRALAX) 17 gram oral Powder in Packet Take 1 Packet (17 g) by mouth as needed in the morning for constipation.     rosuvastatin (CRESTOR) 5 mg oral Tablet Take 1 Tablet (5 mg) by mouth in the morning.     sertraline (ZOLOFT) 100 mg oral Tablet Take 1 Tablet (100 mg) by mouth in the morning.     solifenacin (VESICARE) 10 mg oral Tablet Take 1 Tablet (10 mg) by mouth in the morning.     vibegron (GEMTESA) 75 mg oral Tablet Take 1 Tablet (75 mg) by mouth in the morning.     Allergies as  "of 07/18/2025 - Reviewed 07/18/2025   Allergen Reaction Noted     Latex, natural rubber Rash 01/03/2017     Nitrofurantoin Rash 10/01/2012     Sulfa (sulfonamide antibiotics) Rash and Nausea and / or Vomiting 03/08/2011     Tramadol Nausea Only 10/01/2012     Social History     Tobacco Use     Smoking status: Never     Passive exposure: Never     Smokeless tobacco: Never   Substance Use Topics     Alcohol use: Yes     Comment: 1-2 drinks 2-4 times monthly.     Social History     Substance and Sexual Activity   Drug Use No    Comment: 1-2 cups coffee daily      Family History   Problem Relation Name Age of Onset     Osteoarthritis Mother       Glaucoma Mother       Esophageal cancer Father       Coronary artery disease Father          Bypass x 2     Diabetes type II Father       Thyroid Disease Father          Goiter     Heart attack Father       Diabetes type II Brother       No Known Problems Daughter       No Known Problems Daughter       Macular degeneration Neg Hx         Review of Systems   See HPI above.     Objective  /86 (BP Source: R arm, BP position: Sitting)   Pulse 100   Ht 64.76\" (164.5 cm)   Wt 111 kg (244 lb 11.2 oz)   SpO2 93%   BMI 41.02 kg/m       Physical Exam    Const: Well nourished, well developed, appears stated age   Eyes: PERRL, no conjunctival injection   HENT: NCAT, Neck supple without meningismus   CV: RRR, Warm, well-perfused extremities   RESP: CTAB, Unlabored respiratory effort   GI: Soft, non-tender, non-distended, no masses   MSK: No gross deformities appreciated   Skin: Warm, dry. No rashes   Neuro: Alert, CNs II-XII grossly intact. Sensation and motor function of extremities grossly intact  Psychiatric: Alert, orientated to person place and time; Affect normal, speech fluent, thought content linear, appropriate rate and volume of speech.    WBC Count, Corrected   Date Value Ref Range Status   07/17/2025 12.4 (H) 4.0 - 11.0 10(3)/uL Final     RBC Count   Date Value Ref " Range Status   07/17/2025 4.62 3.90 - 5.20 10(6)/uL Final     Hemoglobin   Date Value Ref Range Status   07/17/2025 14.4 11.8 - 15.8 g/dL Final     Hematocrit   Date Value Ref Range Status   07/17/2025 44.0 35.0 - 45.0 % Final     MCV   Date Value Ref Range Status   07/17/2025 95.3 81.0 - 99.0 fL Final     MCH   Date Value Ref Range Status   07/17/2025 31.1 27.0 - 33.0 pg Final     MCHC   Date Value Ref Range Status   07/17/2025 32.6 32.0 - 36.0 g/dL Final     RDW   Date Value Ref Range Status   07/17/2025 15.9 (H) 11.5 - 15.5 % Final     Platelets   Date Value Ref Range Status   07/17/2025 246 130 - 450 10(3)/uL Final     MPV   Date Value Ref Range Status   07/17/2025 8.5 6.5 - 11.0 fL Final     Abs Neutrophils   Date Value Ref Range Status   07/17/2025 8.9 (H) 2.0 - 8.0 10(3)/uL Final     % Neutrophils   Date Value Ref Range Status   07/17/2025 72.2 40.0 - 75.0 % Final     Abs Lymphocytes   Date Value Ref Range Status   07/17/2025 1.9 1.0 - 4.0 10(3)/uL Final     % Lymphocytes   Date Value Ref Range Status   07/17/2025 15.6 15.0 - 50.0 % Final     Abs Monocytes   Date Value Ref Range Status   07/17/2025 0.9 0.1 - 1.0 10(3)/uL Final     % Monocytes   Date Value Ref Range Status   07/17/2025 7.1 2.0 - 13.0 % Final     Abs Eosinophils   Date Value Ref Range Status   07/17/2025 0.6 0.0 - 0.7 10(3)/uL Final     % Eosinophils   Date Value Ref Range Status   07/17/2025 4.6 <=8.0 % Final     Abs Basophils   Date Value Ref Range Status   07/17/2025 0.1 <=0.2 10(3)/uL Final     % Basophils   Date Value Ref Range Status   07/17/2025 0.5 <=2.0 % Final     Glucose   Date Value Ref Range Status   07/17/2025 142 (H) 70 - 100 mg/dL Final     Blood Urea Nitrogen   Date Value Ref Range Status   07/17/2025 25.0 (H) 7.0 - 20.1 mg/dL Final     Creatinine   Date Value Ref Range Status   07/17/2025 0.94 0.57 - 1.11 mg/dL Final     Chloride   Date Value Ref Range Status   07/17/2025 111 (H) 98 - 107 mmol/L Final     Sodium   Date Value  Ref Range Status   07/17/2025 144 136 - 146 mmol/L Final     Potassium   Date Value Ref Range Status   07/17/2025 4.6 3.5 - 5.1 mmol/L Final     Calcium, Total   Date Value Ref Range Status   07/17/2025 9.4 8.6 - 10.5 mg/dL Final     CO2   Date Value Ref Range Status   07/17/2025 23 22 - 29 mmol/L Final               Personally reviewed each individual lab.     Assessment/ Plan  1. Pre-op evaluation        2. Chronic interstitial cystitis        3. Prediabetes        4. Steatohepatitis, non-alcoholic        5. Gastroesophageal reflux disease, unspecified whether esophagitis present        6. Urge incontinence of urine        7. Moderate persistent asthma without complication (HCC)        8. Depression, major, recurrent, moderate (HCC)          Preop evulation. Patient is here for Pre-op evaluation for cystoscopy and biopsy. METS +4, ASA 3. I do not find any obvious contraindications for the procedure. Medication management discussed. Labs from 7/18/25 reviewed. Labs are stable. Recommended patient have ECG done today, however patient declines. Patient is optimized for surgery from a primary care perspective.    Interstitial Cystitis. Patient with known chronic interstitial cystis. She presented to the ER yesterday with complaints of urinary retention and bladder spasms. Her UA suggested a UTI however her culture did not grow anything significant. Suspect sterile pyuria. She does tell me that her urology team is aware of this and advised her to continue Levaquin. She is not sure if they have seen the culture results. Will have her continue for now due to increased dysuria from baseline and upcoming procedure. However I told her to reach out to her Urology team and get there input again. She is agreeable with this    Prediabetes. Stable. A1c of 6.4 on 5/5/25    Steatohepatitis. AST 70,  5/5/25    GERD. Stable on omeprazole.    Urge incontinence/Overactive bladder. Currently on vigebron 75 mg and solfienacin 10  mg     Asthma. Stable. Well controlled on breo ellipta, albuterol, duo nebs, cetrizine, and montelukast.       The patient was instructed on how to take their medications the day prior and on the date of the surgery.      A copy of this report will be sent to the requesting surgeon and the surgical center.   Patient Instructions   Stop naproxen.    Do not take robaxin the night before the procedure.

## 2025-07-21 ENCOUNTER — HOSPITAL ENCOUNTER (OUTPATIENT)
Facility: CLINIC | Age: 70
Setting detail: OBSERVATION
Discharge: HOME | End: 2025-07-22
Attending: UROLOGY | Admitting: UROLOGY
Payer: COMMERCIAL

## 2025-07-21 ENCOUNTER — ANESTHESIA (OUTPATIENT)
Dept: SURGERY | Facility: CLINIC | Age: 70
End: 2025-07-21
Payer: COMMERCIAL

## 2025-07-21 ENCOUNTER — ANESTHESIA EVENT (OUTPATIENT)
Dept: SURGERY | Facility: CLINIC | Age: 70
End: 2025-07-21
Payer: COMMERCIAL

## 2025-07-21 DIAGNOSIS — R39.89 BLADDER PAIN: Primary | ICD-10-CM

## 2025-07-21 DIAGNOSIS — R73.03 PREDIABETES: ICD-10-CM

## 2025-07-21 DIAGNOSIS — N30.10 INTERSTITIAL CYSTITIS: ICD-10-CM

## 2025-07-21 PROBLEM — J45.40 MODERATE PERSISTENT ASTHMA WITHOUT COMPLICATION: Status: ACTIVE | Noted: 2025-07-21

## 2025-07-21 PROBLEM — G62.9 PERIPHERAL NEUROPATHY: Status: ACTIVE | Noted: 2025-07-21

## 2025-07-21 PROBLEM — K21.9 GASTROESOPHAGEAL REFLUX DISEASE WITHOUT ESOPHAGITIS: Status: ACTIVE | Noted: 2025-07-21

## 2025-07-21 PROCEDURE — 272N000001 HC OR GENERAL SUPPLY STERILE: Performed by: UROLOGY

## 2025-07-21 PROCEDURE — 250N000009 HC RX 250: Performed by: UROLOGY

## 2025-07-21 PROCEDURE — 999N000141 HC STATISTIC PRE-PROCEDURE NURSING ASSESSMENT: Performed by: UROLOGY

## 2025-07-21 PROCEDURE — 250N000013 HC RX MED GY IP 250 OP 250 PS 637

## 2025-07-21 PROCEDURE — 88341 IMHCHEM/IMCYTCHM EA ADD ANTB: CPT | Mod: TC | Performed by: UROLOGY

## 2025-07-21 PROCEDURE — 250N000011 HC RX IP 250 OP 636

## 2025-07-21 PROCEDURE — 250N000011 HC RX IP 250 OP 636: Performed by: ANESTHESIOLOGY

## 2025-07-21 PROCEDURE — 360N000075 HC SURGERY LEVEL 2, PER MIN: Performed by: UROLOGY

## 2025-07-21 PROCEDURE — 250N000013 HC RX MED GY IP 250 OP 250 PS 637: Performed by: UROLOGY

## 2025-07-21 PROCEDURE — 99207 PR APP CREDIT; MD BILLING SHARED VISIT: CPT | Mod: FS | Performed by: STUDENT IN AN ORGANIZED HEALTH CARE EDUCATION/TRAINING PROGRAM

## 2025-07-21 PROCEDURE — 370N000017 HC ANESTHESIA TECHNICAL FEE, PER MIN: Performed by: UROLOGY

## 2025-07-21 PROCEDURE — 250N000009 HC RX 250: Performed by: ANESTHESIOLOGY

## 2025-07-21 PROCEDURE — 710N000012 HC RECOVERY PHASE 2, PER MINUTE: Performed by: UROLOGY

## 2025-07-21 PROCEDURE — 250N000013 HC RX MED GY IP 250 OP 250 PS 637: Performed by: ANESTHESIOLOGY

## 2025-07-21 PROCEDURE — 258N000003 HC RX IP 258 OP 636: Performed by: ANESTHESIOLOGY

## 2025-07-21 PROCEDURE — 99204 OFFICE O/P NEW MOD 45 MIN: CPT | Mod: FS

## 2025-07-21 PROCEDURE — 250N000009 HC RX 250

## 2025-07-21 RX ORDER — PHENAZOPYRIDINE HYDROCHLORIDE 100 MG/1
200 TABLET, FILM COATED ORAL ONCE
Status: COMPLETED | OUTPATIENT
Start: 2025-07-21 | End: 2025-07-21

## 2025-07-21 RX ORDER — ONDANSETRON 2 MG/ML
4 INJECTION INTRAMUSCULAR; INTRAVENOUS ONCE
Status: COMPLETED | OUTPATIENT
Start: 2025-07-21 | End: 2025-07-21

## 2025-07-21 RX ORDER — PANTOPRAZOLE SODIUM 40 MG/1
40 TABLET, DELAYED RELEASE ORAL 2 TIMES DAILY
Status: DISCONTINUED | OUTPATIENT
Start: 2025-07-21 | End: 2025-07-21

## 2025-07-21 RX ORDER — PANTOPRAZOLE SODIUM 40 MG/1
40 TABLET, DELAYED RELEASE ORAL 2 TIMES DAILY
Status: DISCONTINUED | OUTPATIENT
Start: 2025-07-22 | End: 2025-07-22 | Stop reason: HOSPADM

## 2025-07-21 RX ORDER — HYDROCODONE BITARTRATE AND ACETAMINOPHEN 5; 325 MG/1; MG/1
1 TABLET ORAL EVERY 6 HOURS PRN
Qty: 15 TABLET | Refills: 0 | Status: SHIPPED | OUTPATIENT
Start: 2025-07-21

## 2025-07-21 RX ORDER — NALOXONE HYDROCHLORIDE 0.4 MG/ML
0.4 INJECTION, SOLUTION INTRAMUSCULAR; INTRAVENOUS; SUBCUTANEOUS
Status: DISCONTINUED | OUTPATIENT
Start: 2025-07-21 | End: 2025-07-22 | Stop reason: HOSPADM

## 2025-07-21 RX ORDER — GABAPENTIN 400 MG/1
400 CAPSULE ORAL 2 TIMES DAILY
Status: DISCONTINUED | OUTPATIENT
Start: 2025-07-21 | End: 2025-07-22 | Stop reason: HOSPADM

## 2025-07-21 RX ORDER — LIDOCAINE HYDROCHLORIDE 20 MG/ML
JELLY TOPICAL
Status: DISCONTINUED
Start: 2025-07-21 | End: 2025-07-21 | Stop reason: HOSPADM

## 2025-07-21 RX ORDER — ATROPA BELLADONNA AND OPIUM 16.2; 3 MG/1; MG/1
30 SUPPOSITORY RECTAL EVERY 6 HOURS PRN
Refills: 0 | Status: DISCONTINUED | OUTPATIENT
Start: 2025-07-21 | End: 2025-07-21 | Stop reason: HOSPADM

## 2025-07-21 RX ORDER — SODIUM CHLORIDE, SODIUM LACTATE, POTASSIUM CHLORIDE, CALCIUM CHLORIDE 600; 310; 30; 20 MG/100ML; MG/100ML; MG/100ML; MG/100ML
INJECTION, SOLUTION INTRAVENOUS CONTINUOUS
Status: DISCONTINUED | OUTPATIENT
Start: 2025-07-21 | End: 2025-07-21 | Stop reason: HOSPADM

## 2025-07-21 RX ORDER — OMEPRAZOLE 40 MG/1
40 CAPSULE, DELAYED RELEASE ORAL DAILY
COMMUNITY

## 2025-07-21 RX ORDER — HYDROCODONE BITARTRATE AND ACETAMINOPHEN 5; 325 MG/1; MG/1
1 TABLET ORAL ONCE
Status: COMPLETED | OUTPATIENT
Start: 2025-07-21 | End: 2025-07-21

## 2025-07-21 RX ORDER — CETIRIZINE HYDROCHLORIDE 10 MG/1
10 TABLET ORAL DAILY PRN
Status: DISCONTINUED | OUTPATIENT
Start: 2025-07-22 | End: 2025-07-22 | Stop reason: HOSPADM

## 2025-07-21 RX ORDER — PROPOFOL 10 MG/ML
INJECTION, EMULSION INTRAVENOUS CONTINUOUS PRN
Status: DISCONTINUED | OUTPATIENT
Start: 2025-07-21 | End: 2025-07-21

## 2025-07-21 RX ORDER — OXYCODONE HYDROCHLORIDE 5 MG/1
5 TABLET ORAL EVERY 6 HOURS PRN
Status: DISCONTINUED | OUTPATIENT
Start: 2025-07-21 | End: 2025-07-22 | Stop reason: HOSPADM

## 2025-07-21 RX ORDER — LIDOCAINE 40 MG/G
CREAM TOPICAL
Status: DISCONTINUED | OUTPATIENT
Start: 2025-07-21 | End: 2025-07-22 | Stop reason: HOSPADM

## 2025-07-21 RX ORDER — FENTANYL CITRATE 50 UG/ML
INJECTION, SOLUTION INTRAMUSCULAR; INTRAVENOUS PRN
Status: DISCONTINUED | OUTPATIENT
Start: 2025-07-21 | End: 2025-07-21

## 2025-07-21 RX ORDER — ONDANSETRON 4 MG/1
4 TABLET, ORALLY DISINTEGRATING ORAL EVERY 30 MIN PRN
Status: DISCONTINUED | OUTPATIENT
Start: 2025-07-21 | End: 2025-07-21 | Stop reason: HOSPADM

## 2025-07-21 RX ORDER — ROSUVASTATIN CALCIUM 5 MG/1
5 TABLET, COATED ORAL DAILY
Status: DISCONTINUED | OUTPATIENT
Start: 2025-07-22 | End: 2025-07-22 | Stop reason: HOSPADM

## 2025-07-21 RX ORDER — FENTANYL CITRATE 50 UG/ML
50 INJECTION, SOLUTION INTRAMUSCULAR; INTRAVENOUS
Refills: 0 | Status: COMPLETED | OUTPATIENT
Start: 2025-07-21 | End: 2025-07-21

## 2025-07-21 RX ORDER — ACETAMINOPHEN 325 MG/1
650 TABLET ORAL ONCE
Status: COMPLETED | OUTPATIENT
Start: 2025-07-21 | End: 2025-07-21

## 2025-07-21 RX ORDER — DEXAMETHASONE SODIUM PHOSPHATE 4 MG/ML
INJECTION, SOLUTION INTRA-ARTICULAR; INTRALESIONAL; INTRAMUSCULAR; INTRAVENOUS; SOFT TISSUE PRN
Status: DISCONTINUED | OUTPATIENT
Start: 2025-07-21 | End: 2025-07-21

## 2025-07-21 RX ORDER — CEFAZOLIN SODIUM/WATER 2 G/20 ML
SYRINGE (ML) INTRAVENOUS
Status: DISCONTINUED
Start: 2025-07-21 | End: 2025-07-21 | Stop reason: HOSPADM

## 2025-07-21 RX ORDER — METHOCARBAMOL 500 MG/1
500 TABLET, FILM COATED ORAL 3 TIMES DAILY PRN
Status: DISCONTINUED | OUTPATIENT
Start: 2025-07-21 | End: 2025-07-21

## 2025-07-21 RX ORDER — HYDROXYZINE HYDROCHLORIDE 10 MG/1
10 TABLET, FILM COATED ORAL DAILY
Status: DISCONTINUED | OUTPATIENT
Start: 2025-07-22 | End: 2025-07-22 | Stop reason: HOSPADM

## 2025-07-21 RX ORDER — LIDOCAINE HYDROCHLORIDE 20 MG/ML
JELLY TOPICAL PRN
Status: DISCONTINUED | OUTPATIENT
Start: 2025-07-21 | End: 2025-07-21 | Stop reason: HOSPADM

## 2025-07-21 RX ORDER — MONTELUKAST SODIUM 10 MG/1
10 TABLET ORAL DAILY
Status: DISCONTINUED | OUTPATIENT
Start: 2025-07-22 | End: 2025-07-22 | Stop reason: HOSPADM

## 2025-07-21 RX ORDER — LIDOCAINE 40 MG/G
CREAM TOPICAL
Status: DISCONTINUED | OUTPATIENT
Start: 2025-07-21 | End: 2025-07-21 | Stop reason: HOSPADM

## 2025-07-21 RX ORDER — NALOXONE HYDROCHLORIDE 0.4 MG/ML
0.2 INJECTION, SOLUTION INTRAMUSCULAR; INTRAVENOUS; SUBCUTANEOUS
Status: DISCONTINUED | OUTPATIENT
Start: 2025-07-21 | End: 2025-07-22 | Stop reason: HOSPADM

## 2025-07-21 RX ORDER — ONDANSETRON 2 MG/ML
4 INJECTION INTRAMUSCULAR; INTRAVENOUS EVERY 30 MIN PRN
Status: DISCONTINUED | OUTPATIENT
Start: 2025-07-21 | End: 2025-07-21 | Stop reason: HOSPADM

## 2025-07-21 RX ORDER — ATROPA BELLADONNA AND OPIUM 16.2; 3 MG/1; MG/1
15 SUPPOSITORY RECTAL EVERY 6 HOURS PRN
Refills: 0 | Status: DISCONTINUED | OUTPATIENT
Start: 2025-07-21 | End: 2025-07-22 | Stop reason: HOSPADM

## 2025-07-21 RX ORDER — APREPITANT 40 MG/1
40 CAPSULE ORAL ONCE
Status: COMPLETED | OUTPATIENT
Start: 2025-07-21 | End: 2025-07-21

## 2025-07-21 RX ORDER — DEXAMETHASONE SODIUM PHOSPHATE 10 MG/ML
4 INJECTION, SOLUTION INTRAMUSCULAR; INTRAVENOUS
Status: DISCONTINUED | OUTPATIENT
Start: 2025-07-21 | End: 2025-07-21 | Stop reason: HOSPADM

## 2025-07-21 RX ORDER — CEFAZOLIN SODIUM/WATER 2 G/20 ML
SYRINGE (ML) INTRAVENOUS PRN
Status: DISCONTINUED | OUTPATIENT
Start: 2025-07-21 | End: 2025-07-21

## 2025-07-21 RX ORDER — OXYCODONE HYDROCHLORIDE 5 MG/1
10 TABLET ORAL
Status: COMPLETED | OUTPATIENT
Start: 2025-07-21 | End: 2025-07-21

## 2025-07-21 RX ORDER — KETOROLAC TROMETHAMINE 30 MG/ML
15 INJECTION, SOLUTION INTRAMUSCULAR; INTRAVENOUS EVERY 6 HOURS PRN
Status: DISCONTINUED | OUTPATIENT
Start: 2025-07-21 | End: 2025-07-22 | Stop reason: HOSPADM

## 2025-07-21 RX ORDER — PROPOFOL 10 MG/ML
INJECTION, EMULSION INTRAVENOUS PRN
Status: DISCONTINUED | OUTPATIENT
Start: 2025-07-21 | End: 2025-07-21

## 2025-07-21 RX ORDER — OXYCODONE HYDROCHLORIDE 5 MG/1
5 TABLET ORAL
Status: DISCONTINUED | OUTPATIENT
Start: 2025-07-21 | End: 2025-07-21 | Stop reason: HOSPADM

## 2025-07-21 RX ORDER — FLUTICASONE FUROATE AND VILANTEROL 200; 25 UG/1; UG/1
1 POWDER RESPIRATORY (INHALATION) DAILY
Status: DISCONTINUED | OUTPATIENT
Start: 2025-07-22 | End: 2025-07-22 | Stop reason: HOSPADM

## 2025-07-21 RX ORDER — CYCLOBENZAPRINE HCL 10 MG
10 TABLET ORAL 3 TIMES DAILY
Status: DISCONTINUED | OUTPATIENT
Start: 2025-07-21 | End: 2025-07-22 | Stop reason: HOSPADM

## 2025-07-21 RX ORDER — IBUPROFEN 600 MG/1
600 TABLET, FILM COATED ORAL ONCE
Status: DISCONTINUED | OUTPATIENT
Start: 2025-07-21 | End: 2025-07-22 | Stop reason: HOSPADM

## 2025-07-21 RX ORDER — LIDOCAINE HYDROCHLORIDE 10 MG/ML
INJECTION, SOLUTION INFILTRATION; PERINEURAL PRN
Status: DISCONTINUED | OUTPATIENT
Start: 2025-07-21 | End: 2025-07-21

## 2025-07-21 RX ORDER — TOLTERODINE 2 MG/1
4 CAPSULE, EXTENDED RELEASE ORAL DAILY
Status: DISCONTINUED | OUTPATIENT
Start: 2025-07-22 | End: 2025-07-22 | Stop reason: HOSPADM

## 2025-07-21 RX ORDER — NALOXONE HYDROCHLORIDE 0.4 MG/ML
0.1 INJECTION, SOLUTION INTRAMUSCULAR; INTRAVENOUS; SUBCUTANEOUS
Status: DISCONTINUED | OUTPATIENT
Start: 2025-07-21 | End: 2025-07-21 | Stop reason: HOSPADM

## 2025-07-21 RX ORDER — HYDROMORPHONE HYDROCHLORIDE 1 MG/ML
0.3 INJECTION, SOLUTION INTRAMUSCULAR; INTRAVENOUS; SUBCUTANEOUS
OUTPATIENT
Start: 2025-07-21 | End: 2025-07-22

## 2025-07-21 RX ORDER — NAPROXEN SODIUM 220 MG/1
220 TABLET, FILM COATED ORAL EVERY 12 HOURS PRN
COMMUNITY

## 2025-07-21 RX ADMIN — FENTANYL CITRATE 50 MCG: 50 INJECTION INTRAMUSCULAR; INTRAVENOUS at 09:46

## 2025-07-21 RX ADMIN — PROPOFOL 130 MCG/KG/MIN: 10 INJECTION, EMULSION INTRAVENOUS at 12:40

## 2025-07-21 RX ADMIN — SODIUM CHLORIDE, SODIUM LACTATE, POTASSIUM CHLORIDE, AND CALCIUM CHLORIDE: .6; .31; .03; .02 INJECTION, SOLUTION INTRAVENOUS at 08:57

## 2025-07-21 RX ADMIN — OXYCODONE HYDROCHLORIDE 10 MG: 5 TABLET ORAL at 13:37

## 2025-07-21 RX ADMIN — KETOROLAC TROMETHAMINE 15 MG: 30 INJECTION, SOLUTION INTRAMUSCULAR at 14:12

## 2025-07-21 RX ADMIN — FENTANYL CITRATE 25 MCG: 50 INJECTION INTRAMUSCULAR; INTRAVENOUS at 12:40

## 2025-07-21 RX ADMIN — APREPITANT 40 MG: 40 CAPSULE ORAL at 09:45

## 2025-07-21 RX ADMIN — PHENAZOPYRIDINE 200 MG: 100 TABLET ORAL at 16:43

## 2025-07-21 RX ADMIN — OXYCODONE HYDROCHLORIDE 5 MG: 5 TABLET ORAL at 23:11

## 2025-07-21 RX ADMIN — Medication 2 G: at 12:36

## 2025-07-21 RX ADMIN — MIDAZOLAM 1 MG: 1 INJECTION INTRAMUSCULAR; INTRAVENOUS at 12:35

## 2025-07-21 RX ADMIN — DEXAMETHASONE SODIUM PHOSPHATE 4 MG: 4 INJECTION, SOLUTION INTRA-ARTICULAR; INTRALESIONAL; INTRAMUSCULAR; INTRAVENOUS; SOFT TISSUE at 12:45

## 2025-07-21 RX ADMIN — ACETAMINOPHEN 650 MG: 325 TABLET ORAL at 14:12

## 2025-07-21 RX ADMIN — GABAPENTIN 400 MG: 400 CAPSULE ORAL at 21:31

## 2025-07-21 RX ADMIN — HYDROCODONE BITARTRATE AND ACETAMINOPHEN 1 TABLET: 5; 325 TABLET ORAL at 18:35

## 2025-07-21 RX ADMIN — ONDANSETRON 4 MG: 2 INJECTION, SOLUTION INTRAMUSCULAR; INTRAVENOUS at 09:46

## 2025-07-21 RX ADMIN — ATROPA BELLADONNA AND OPIUM 1 SUPPOSITORY: 16.2; 3 SUPPOSITORY RECTAL at 15:11

## 2025-07-21 RX ADMIN — KETOROLAC TROMETHAMINE 15 MG: 30 INJECTION, SOLUTION INTRAMUSCULAR at 23:21

## 2025-07-21 RX ADMIN — LIDOCAINE HYDROCHLORIDE 5 ML: 10 INJECTION, SOLUTION INFILTRATION; PERINEURAL at 12:40

## 2025-07-21 RX ADMIN — CYCLOBENZAPRINE 10 MG: 10 TABLET, FILM COATED ORAL at 21:31

## 2025-07-21 RX ADMIN — CEPHALEXIN 250 MG: 250 CAPSULE ORAL at 21:31

## 2025-07-21 RX ADMIN — PROPOFOL 20 MG: 10 INJECTION, EMULSION INTRAVENOUS at 12:40

## 2025-07-21 ASSESSMENT — ACTIVITIES OF DAILY LIVING (ADL)
ADLS_ACUITY_SCORE: 44
ADLS_ACUITY_SCORE: 42
ADLS_ACUITY_SCORE: 46
ADLS_ACUITY_SCORE: 44
ADLS_ACUITY_SCORE: 46
ADLS_ACUITY_SCORE: 44
ADLS_ACUITY_SCORE: 46
ADLS_ACUITY_SCORE: 46
ADLS_ACUITY_SCORE: 44
ADLS_ACUITY_SCORE: 46

## 2025-07-21 NOTE — OP NOTE
Date of service: 7-     Preoperative diagnosis: bladder pain, oab, interstitial cystitis   Postoperative diagnosis: Same     Procedure: cystoscopy bladder biopsy and fulguration     Surgeon: Rufina Shaw MD     Specimen bladder bioopsy     Findings:   Bladder with some cystitis cystica appearance, some areas of old kenalog noted and cleared epithelium in that area, noted with jewell nodular appearance ulcerations/erpilail changes     Drain: 16 silicone toth     Anesthesia: sedation     Indications: Bernice is a 68 yo female with hx of bladder pain, OAB and ulcerative IC. She has had negative bladder biopsies, and cytology, recent CT negative. She was on steriods and stopped them as she was uncertain that it was helping. She presents today for bladder biopsy and fulgration again. Risks/benefits and alternatives discussed.     Procedure: Patient correctly identified, informed consent was obtained. She was prepped and drapped in the usual sterile surgical fashion. IV antibiotic was dosed and timeout was called with everyone int he room in agreement.     Using a 30 and 70 degree lens, cystoscopy was carried out. There was some cystitis cystica, cleanarce for erythema near old kenlaog and noted with more nodular like appearnace of the epielium. There was an anterior area of the bladder neck as well. Biopsies were taken and fulgrated. The bladder neck was very difficult to biopsy it was fulgrated. The ureteral orifices were seen effluxing.     A 16 silcone toth was placed for bladder drainage. This was discussed prior to the case.     There were no complications.     Rufina Shaw MD on 7/21/2025 at 1:33 PM

## 2025-07-21 NOTE — ANESTHESIA CARE TRANSFER NOTE
Patient: Bernice Umaña    Procedure: Procedure(s):  CYSTOSCOPY, BLADDER BIOPSY AND FULGURATION       Diagnosis: Interstitial cystitis [N30.10]  Diagnosis Additional Information: No value filed.    Anesthesia Type:   MAC     Note:    Oropharynx: oropharynx clear of all foreign objects and spontaneously breathing  Level of Consciousness: drowsy  Oxygen Supplementation: room air    Independent Airway: airway patency satisfactory and stable  Dentition: dentition changed  Vital Signs Stable: post-procedure vital signs reviewed and stable  Report to RN Given: handoff report given  Patient transferred to: Phase II    Handoff Report: Identifed the Patient, Identified the Reponsible Provider, Reviewed the pertinent medical history, Discussed the surgical course, Reviewed Intra-OP anesthesia mangement and issues during anesthesia, Set expectations for post-procedure period and Allowed opportunity for questions and acknowledgement of understanding      Vitals:  Vitals Value Taken Time   /74 07/21/25 13:21   Temp 36.2  C (97.1  F) 07/21/25 13:21   Pulse 75 07/21/25 13:23   Resp 16 07/21/25 13:21   SpO2 95 % 07/21/25 13:23   Vitals shown include unfiled device data.    Electronically Signed By: KRISTINA Melendez CRNA  July 21, 2025  1:24 PM

## 2025-07-21 NOTE — ANESTHESIA PREPROCEDURE EVALUATION
Anesthesia Pre-Procedure Evaluation    Patient: Bernice Umaña   MRN: [id number] : 1955          Procedure : Procedure(s):  CYSTOSCOPY, BLADDER BIOPSY AND FULGURATION         Past Medical History:   Diagnosis Date    Arthritis     Asthma     Gastro-oesophageal reflux disease     Sleep apnea       Past Surgical History:   Procedure Laterality Date    ARTHROPLASTY KNEE BILATERAL  10/8/2012    Procedure: ARTHROPLASTY KNEE BILATERAL;  Bilateral Total Knee Arthroplasty;  Surgeon: Nino Andre MD;  Location: [facility]    HYSTERECTOMY        Allergies   Allergen Reactions    Sulfa Antibiotics Hives    Tramadol Nausea    Latex Rash    Macrobid [Nitrofurantoin Anhydrous] Rash      Social History     Tobacco Use    Smoking status: Never     Passive exposure: Never    Smokeless tobacco: Never   Substance Use Topics    Alcohol use: Yes     Comment: 2 monthly      Wt Readings from Last 1 Encounters:   25 108 kg (238 lb)        Anesthesia Evaluation   Pt has had prior anesthetic.         ROS/MED HX  ENT/Pulmonary:     (+) sleep apnea, uses CPAP,                    asthma                  Neurologic:  - neg neurologic ROS     Cardiovascular:     (+)  hypertension- -   -  - -                                      METS/Exercise Tolerance:     Hematologic:       Musculoskeletal:   (+)  arthritis,             GI/Hepatic:     (+) GERD, Asymptomatic on medication,                  Renal/Genitourinary: Comment: cystitis    (+) renal disease,             Endo:     (+)  type II DM,             Obesity,       Psychiatric/Substance Use:     (+)    H/O chronic opioid use .     Infectious Disease:       Malignancy:       Other:      (+)  , H/O Chronic Pain,           Physical Exam  Airway  Mallampati: III  TM distance: >3 FB  Mouth opening: < 4 cm    Cardiovascular   Rhythm: regular  Rate: normal rate     Dental   (+) Modest Abnormalities - crowns, retainers, 1 or 2 missing teeth      Pulmonary Breath sounds clear to auscultation     "    Neurological   She appears awake, alert and oriented x3.    Other Findings       OUTSIDE LABS:  CBC:   Lab Results   Component Value Date    WBC 10.9 11/01/2024    WBC 8.9 02/13/2023    HGB 14.0 11/01/2024    HGB 12.0 02/13/2023    HCT 43.4 11/01/2024    HCT 37.7 02/13/2023     11/01/2024     02/13/2023     BMP:   Lab Results   Component Value Date     02/13/2023     02/12/2023    POTASSIUM 3.6 02/14/2023    POTASSIUM 3.9 02/13/2023    CHLORIDE 112 (H) 02/13/2023    CHLORIDE 111 (H) 02/12/2023    CO2 24 02/13/2023    CO2 23 02/12/2023    BUN 9 02/13/2023    BUN 12 02/12/2023    CR 0.91 11/01/2024    CR 0.65 02/13/2023    CR 0.65 02/13/2023    GLC 91 02/13/2023    GLC 99 02/12/2023     COAGS:   Lab Results   Component Value Date    INR 1.78 (H) 10/12/2012     POC: No results found for: \"BGM\", \"HCG\", \"HCGS\"  HEPATIC:   Lab Results   Component Value Date    ALBUMIN 3.2 (L) 02/11/2023    PROTTOTAL 6.4 02/11/2023    ALT 24 11/01/2024    AST 22 02/11/2023    ALKPHOS 92 02/11/2023    BILITOTAL 0.6 02/11/2023     OTHER:   Lab Results   Component Value Date    LACT 0.7 02/11/2023    PIETRO 8.8 02/13/2023    CRP 13.9 (H) 02/13/2023       Anesthesia Plan    ASA Status:  3      NPO Status: NPO Appropriate   Anesthesia Type: MAC.  Airway: natural airway.  Induction: intravenous.  Maintenance: TIVA.   Techniques and Equipment:       - Monitoring Plan: standard ASA monitoring     Consents            Postoperative Care         Comments:    Other Comments: Having very painful bladder spasms- will give fentanyl with zofran and emend in preop               Priti Allen MD    I have reviewed the pertinent notes and labs in the chart from the past 30 days and (re)examined the patient.  Any updates or changes from those notes are reflected in this note.    Clinically Significant Risk Factors Present on Admission                             # Obesity: Estimated body mass index is 38.41 kg/m  as calculated from " "the following:    Height as of this encounter: 1.676 m (5' 6\").    Weight as of this encounter: 108 kg (238 lb).                    "

## 2025-07-21 NOTE — DISCHARGE INSTRUCTIONS
You have received 975 mg of Acetaminophen (Tylenol) at 2:15PM . Please do not take an additional dose of Tylenol until after 8:15 PM      Do not exceed 4,000 mg of acetaminophen during a 24 hour period and keep in mind that acetaminophen can also be found in many over-the-counter cold medications as well as narcotics that may be given for pain.     You received an IV medication today called Toradol. You received this medication at 2:15 PM . This is a NSAID. Therefore, do not take any NSAIDS (Ibuprofen products, Advil, Motrin, etc) until 8:15 PM .

## 2025-07-21 NOTE — BRIEF OP NOTE
[facility]    Brief Operative Note    Pre-operative diagnosis: Interstitial cystitis [N30.10]  Post-operative diagnosis Same as pre-operative diagnosis    Procedure: CYSTOSCOPY, BLADDER BIOPSY AND FULGURATION, N/A - Bladder    Surgeon: Surgeons and Role:     * Rufina Shaw MD - Primary  Anesthesia: Choice   Estimated Blood Loss: Minimal    Drains: 16 silcone toth  Specimens:   ID Type Source Tests Collected by Time Destination   1 : Bladder Biopsy Tissue Urinary Bladder SURGICAL PATHOLOGY EXAM Rufina Shaw MD 7/21/2025 12:53 PM      Findings:   None.  Complications: None.  Implants: * No implants in log *

## 2025-07-21 NOTE — INTERVAL H&P NOTE
"I have reviewed the surgical (or preoperative) H&P that is linked to this encounter, and examined the patient. There are no significant changes    Clinical Conditions Present on Arrival:  Clinically Significant Risk Factors Present on Admission                       # Obesity: Estimated body mass index is 38.41 kg/m  as calculated from the following:    Height as of this encounter: 1.676 m (5' 6\").    Weight as of this encounter: 108 kg (238 lb).       "

## 2025-07-21 NOTE — ANESTHESIA POSTPROCEDURE EVALUATION
Patient: Bernice Umaña    Procedure: Procedure(s):  CYSTOSCOPY, BLADDER BIOPSY AND FULGURATION       Anesthesia Type:  MAC    Note:  Disposition: Outpatient   Postop Pain Control: Uneventful            Sign Out: Well controlled pain   PONV: No   Neuro/Psych: Uneventful            Sign Out: Acceptable/Baseline neuro status   Airway/Respiratory: Uneventful            Sign Out: Acceptable/Baseline resp. status   CV/Hemodynamics: Uneventful            Sign Out: Acceptable CV status   Other NRE:    DID A NON-ROUTINE EVENT OCCUR?            Last vitals:  Vitals Value Taken Time   /73 07/21/25 15:01   Temp 36.2  C (97.1  F) 07/21/25 13:21   Pulse 55 07/21/25 15:17   Resp 16 07/21/25 13:21   SpO2 95 % 07/21/25 15:17   Vitals shown include unfiled device data.    Electronically Signed By: Leonardo Hyman MD  July 21, 2025  3:18 PM

## 2025-07-21 NOTE — PROVIDER NOTIFICATION
Dr. Shaw here to see pt at request of nurse.  Pt continues to c/o bladder spasms equating in 7/10 pain.  Pt has already been given IV Toradol, Oxycodone 10mg PO, Tylenol 650mg PO, B&O Suppository.    Dr. Shaw givens option to pt of catheter removal, pt prefers not at this time.    Will push PO fluids, run remaining IVF in, Pyridium 200mg PO given at this time.    If pt feels improved. Ok to continue to discharge to home.

## 2025-07-22 VITALS
SYSTOLIC BLOOD PRESSURE: 151 MMHG | DIASTOLIC BLOOD PRESSURE: 83 MMHG | RESPIRATION RATE: 18 BRPM | WEIGHT: 243.2 LBS | OXYGEN SATURATION: 97 % | BODY MASS INDEX: 39.08 KG/M2 | HEART RATE: 92 BPM | HEIGHT: 66 IN | TEMPERATURE: 97.5 F

## 2025-07-22 LAB
ALBUMIN UR-MCNC: 300 MG/DL
ANION GAP SERPL CALCULATED.3IONS-SCNC: 10 MMOL/L (ref 7–15)
APPEARANCE UR: ABNORMAL
BACTERIA #/AREA URNS HPF: ABNORMAL /HPF
BILIRUB UR QL STRIP: ABNORMAL
BUN SERPL-MCNC: 21 MG/DL (ref 8–23)
CALCIUM SERPL-MCNC: 9.6 MG/DL (ref 8.8–10.4)
CHLORIDE SERPL-SCNC: 103 MMOL/L (ref 98–107)
COLOR UR AUTO: ABNORMAL
CREAT SERPL-MCNC: 0.8 MG/DL (ref 0.51–0.95)
EGFRCR SERPLBLD CKD-EPI 2021: 80 ML/MIN/1.73M2
ERYTHROCYTE [DISTWIDTH] IN BLOOD BY AUTOMATED COUNT: 14.1 % (ref 10–15)
GLUCOSE BLDC GLUCOMTR-MCNC: 110 MG/DL (ref 70–99)
GLUCOSE SERPL-MCNC: 118 MG/DL (ref 70–99)
GLUCOSE UR STRIP-MCNC: 100 MG/DL
HCO3 SERPL-SCNC: 23 MMOL/L (ref 22–29)
HCT VFR BLD AUTO: 45.5 % (ref 35–47)
HGB BLD-MCNC: 14.6 G/DL (ref 11.7–15.7)
HGB UR QL STRIP: ABNORMAL
KETONES UR STRIP-MCNC: NEGATIVE MG/DL
LEUKOCYTE ESTERASE UR QL STRIP: ABNORMAL
MCH RBC QN AUTO: 30.7 PG (ref 26.5–33)
MCHC RBC AUTO-ENTMCNC: 32.1 G/DL (ref 31.5–36.5)
MCV RBC AUTO: 96 FL (ref 78–100)
NITRATE UR QL: NEGATIVE
PH UR STRIP: 7 [PH] (ref 5–7)
PLATELET # BLD AUTO: 262 10E3/UL (ref 150–450)
POTASSIUM SERPL-SCNC: 4.5 MMOL/L (ref 3.4–5.3)
RBC # BLD AUTO: 4.76 10E6/UL (ref 3.8–5.2)
RBC URINE: >182 /HPF
SODIUM SERPL-SCNC: 136 MMOL/L (ref 135–145)
SP GR UR STRIP: 1.02 (ref 1–1.03)
UROBILINOGEN UR STRIP-MCNC: NORMAL MG/DL
WBC # BLD AUTO: 11.2 10E3/UL (ref 4–11)
WBC CLUMPS #/AREA URNS HPF: PRESENT /HPF
WBC URINE: >182 /HPF

## 2025-07-22 PROCEDURE — 250N000013 HC RX MED GY IP 250 OP 250 PS 637

## 2025-07-22 PROCEDURE — 82962 GLUCOSE BLOOD TEST: CPT

## 2025-07-22 PROCEDURE — 81003 URINALYSIS AUTO W/O SCOPE: CPT | Performed by: NURSE PRACTITIONER

## 2025-07-22 PROCEDURE — 82310 ASSAY OF CALCIUM: CPT | Performed by: FAMILY MEDICINE

## 2025-07-22 PROCEDURE — 87086 URINE CULTURE/COLONY COUNT: CPT | Performed by: NURSE PRACTITIONER

## 2025-07-22 PROCEDURE — 250N000013 HC RX MED GY IP 250 OP 250 PS 637: Performed by: UROLOGY

## 2025-07-22 PROCEDURE — 99232 SBSQ HOSP IP/OBS MODERATE 35: CPT | Performed by: FAMILY MEDICINE

## 2025-07-22 PROCEDURE — 85018 HEMOGLOBIN: CPT | Performed by: FAMILY MEDICINE

## 2025-07-22 PROCEDURE — G0378 HOSPITAL OBSERVATION PER HR: HCPCS

## 2025-07-22 PROCEDURE — 250N000013 HC RX MED GY IP 250 OP 250 PS 637: Performed by: NURSE PRACTITIONER

## 2025-07-22 PROCEDURE — 36415 COLL VENOUS BLD VENIPUNCTURE: CPT | Performed by: FAMILY MEDICINE

## 2025-07-22 PROCEDURE — 250N000011 HC RX IP 250 OP 636: Performed by: ANESTHESIOLOGY

## 2025-07-22 RX ORDER — ACETAMINOPHEN 325 MG/1
325-650 TABLET ORAL EVERY 6 HOURS PRN
COMMUNITY
Start: 2025-07-22

## 2025-07-22 RX ORDER — PHENAZOPYRIDINE HYDROCHLORIDE 100 MG/1
200 TABLET, FILM COATED ORAL
Status: DISCONTINUED | OUTPATIENT
Start: 2025-07-22 | End: 2025-07-22 | Stop reason: HOSPADM

## 2025-07-22 RX ORDER — CYCLOBENZAPRINE HCL 10 MG
10 TABLET ORAL 3 TIMES DAILY
Qty: 9 TABLET | Refills: 0 | Status: SHIPPED | OUTPATIENT
Start: 2025-07-22 | End: 2025-07-25

## 2025-07-22 RX ORDER — ACETAMINOPHEN 325 MG/1
650 TABLET ORAL EVERY 6 HOURS PRN
Status: DISCONTINUED | OUTPATIENT
Start: 2025-07-22 | End: 2025-07-22 | Stop reason: HOSPADM

## 2025-07-22 RX ORDER — PHENAZOPYRIDINE HYDROCHLORIDE 200 MG/1
200 TABLET, FILM COATED ORAL 3 TIMES DAILY PRN
Status: SHIPPED
Start: 2025-07-22 | End: 2025-07-25

## 2025-07-22 RX ADMIN — VIBEGRON 75 MG: 75 TABLET, FILM COATED ORAL at 09:30

## 2025-07-22 RX ADMIN — CYCLOBENZAPRINE 10 MG: 10 TABLET, FILM COATED ORAL at 09:29

## 2025-07-22 RX ADMIN — MONTELUKAST 10 MG: 10 TABLET, FILM COATED ORAL at 09:30

## 2025-07-22 RX ADMIN — PHENAZOPYRIDINE 200 MG: 100 TABLET ORAL at 09:31

## 2025-07-22 RX ADMIN — PANTOPRAZOLE SODIUM 40 MG: 40 TABLET, DELAYED RELEASE ORAL at 09:30

## 2025-07-22 RX ADMIN — SERTRALINE HYDROCHLORIDE 100 MG: 50 TABLET, FILM COATED ORAL at 09:30

## 2025-07-22 RX ADMIN — TOLTERODINE TARTRATE 4 MG: 2 CAPSULE, EXTENDED RELEASE ORAL at 09:29

## 2025-07-22 RX ADMIN — KETOROLAC TROMETHAMINE 15 MG: 30 INJECTION, SOLUTION INTRAMUSCULAR at 09:37

## 2025-07-22 RX ADMIN — HYDROXYZINE HYDROCHLORIDE 10 MG: 10 TABLET, FILM COATED ORAL at 09:29

## 2025-07-22 RX ADMIN — GABAPENTIN 400 MG: 400 CAPSULE ORAL at 09:30

## 2025-07-22 RX ADMIN — ROSUVASTATIN CALCIUM 5 MG: 5 TABLET, FILM COATED ORAL at 09:30

## 2025-07-22 ASSESSMENT — ACTIVITIES OF DAILY LIVING (ADL)
ADLS_ACUITY_SCORE: 44
ADLS_ACUITY_SCORE: 45
ADLS_ACUITY_SCORE: 44
ADLS_ACUITY_SCORE: 44
ADLS_ACUITY_SCORE: 45
ADLS_ACUITY_SCORE: 44

## 2025-07-22 NOTE — OR NURSING
"Pt states unable \"to go home this way\" referring to pain and bladder spasms.  Pt had IVF, and Pyridium did not improve symptoms.  Dr. Shaw consulted.  Pt can go home the way she feel now/this way, or stay overnight for pain control.  Pt does not want toth catheter removed, and does not want to go home tonight due to pain level.    Pt was admitted with 10/10 pain, now reporting 7/10 but states it is \"more intense, worse, I can't go home this way.\"    Orders received for \"Outpt in a Bed\" overnight. Anticipate discharge in AM.    Will await bed placement.  "

## 2025-07-22 NOTE — PHARMACY-ADMISSION MEDICATION HISTORY
Pharmacist Admission Medication History    Admission medication history is complete. The information provided in this note is only as accurate as the sources available at the time of the update.    Information Source(s): Patient and CareEverywhere/SureScripts via in-person    Pertinent Information:     Changes made to PTA medication list:  Added: metronidazole cream  Deleted: folic acid, methotrexate, MVI, prednisone  Changed: None    Allergies reviewed with patient and updates made in EHR: yes    Patient has Breo inhaler with if needed      Medication History Completed By: Taz Arevalo RPH 7/21/2025 8:42 PM    PTA Med List   Medication Sig Note Last Dose/Taking    albuterol (PROVENTIL) (2.5 MG/3ML) 0.083% neb solution Take 2.5 mg by nebulization every 4 hours as needed for shortness of breath, wheezing or cough  More than a month    cephALEXin (KEFLEX) 250 MG capsule Take 250 mg by mouth at bedtime.  7/20/2025 Bedtime    cetirizine (ZYRTEC) 10 MG tablet Take 10 mg by mouth daily as needed.    7/21/2025 Morning    clobetasol (TEMOVATE) 0.05 % GEL topical gel Apply topically daily as needed (rash).  Past Month    estradiol 0.01% in vanicream - PHARMACY TO MIX - dispense with applicator Place 2 g vaginally twice a week.  Past Week    fluticasone-vilanterol (BREO ELLIPTA) 200-25 MCG/ACT inhaler Inhale 1 puff into the lungs daily Has with 7/21/2025 Morning    gabapentin (NEURONTIN) 400 MG capsule Take 400 mg by mouth 2 times daily.  7/21/2025 Noon           hydrOXYzine HCl (ATARAX) 10 MG tablet Take 10 mg by mouth daily.  7/21/2025 Morning    ipratropium - albuterol 0.5 mg/2.5 mg/3 mL (DUONEB) 0.5-2.5 (3) MG/3ML neb solution Take 1 vial by nebulization every 6 hours as needed for shortness of breath, wheezing or cough  More than a month    levofloxacin (LEVAQUIN) 750 MG tablet Take 750 mg by mouth daily. 7/21/2025: 7 day course started 7/18 7/21/2025 Morning    methocarbamol (ROBAXIN) 500 MG tablet Take 500 mg by  mouth 3 times daily as needed for muscle spasms.  7/20/2025    metroNIDAZOLE (METROCREAM) 0.75 % external cream Apply topically 2 times daily as needed (rash).  Taking As Needed    montelukast (SINGULAIR) 10 MG tablet Take 10 mg by mouth daily  7/21/2025 Morning    naproxen sodium (ANAPROX) 220 MG tablet Take 220 mg by mouth every 12 hours as needed for moderate pain.  Taking As Needed    omeprazole (PRILOSEC) 40 MG DR capsule Take 40 mg by mouth daily.  7/21/2025 Morning    rosuvastatin (CRESTOR) 5 MG tablet Take 5 mg by mouth daily.  7/21/2025 Morning    sertraline (ZOLOFT) 100 MG tablet Take 100 mg by mouth daily  7/21/2025 Morning    solifenacin (VESICARE) 10 MG tablet Take 10 mg by mouth daily.  7/20/2025 Morning    vibegron (GEMTESA) 75 MG TABS tablet Take 75 mg by mouth daily.  7/21/2025 Morning    VITAMIN D, CHOLECALCIFEROL, PO Take 2,000 Units by mouth daily  7/20/2025 Morning

## 2025-07-22 NOTE — PROGRESS NOTES
CARE MANAGEMENT NOTE:    Pt is classed as Observation.  Discussed Observation status with pt.  GERMAIN document signed by pt; copy given to pt, original placed in pt's chart.  Copy faxed to HIM fax [phone].    Pt denies any CM needs.  Pt discharging to home; family transport.    No further care management intervention anticipated at this time.  Please re-consult if further needs arise.  Care management signing off.      Mikel Cast RN  7/22/2025 10:04 AM

## 2025-07-22 NOTE — PLAN OF CARE
Problem: Adult Inpatient Plan of Care  Goal: Optimal Comfort and Wellbeing  Outcome: Progressing  Intervention: Monitor Pain and Promote Comfort  Recent Flowsheet Documentation  Taken 7/21/2025 2353 by Monse Sheppard RN  Pain Management Interventions: rest  Taken 7/21/2025 2321 by Monse Sheppard RN  Pain Management Interventions: medication (see MAR)  Intervention: Provide Person-Centered Care  Recent Flowsheet Documentation  Taken 7/22/2025 0000 by Monse Sheppard RN  Trust Relationship/Rapport:   care explained   choices provided     Problem: Pain Acute  Goal: Optimal Pain Control and Function  Outcome: Progressing  Intervention: Develop Pain Management Plan  Recent Flowsheet Documentation  Taken 7/21/2025 2353 by Monse Sheppard RN  Pain Management Interventions: rest  Taken 7/21/2025 2321 by Monse Sheppard RN  Pain Management Interventions: medication (see MAR)  Intervention: Prevent or Manage Pain  Recent Flowsheet Documentation  Taken 7/22/2025 0000 by Monse Sheppard RN  Sensory Stimulation Regulation:   lighting decreased   care clustered   quiet environment promoted  Sleep/Rest Enhancement:   room darkened   regular sleep/rest pattern promoted  Medication Review/Management: medications reviewed   Goal Outcome Evaluation:    Patient alert and oriented x 4, speech clear.  Lungs clear on CPAP.  Indwelling catheter patent orange urine draining.  Left PIV patent flushed and SL. Patient given Toradol prn for burning cramping pain rated 8/10 slowly reducing.  CMS positive.

## 2025-07-22 NOTE — PROVIDER NOTIFICATION
Pt discharge to home via family. PIV removed. All belongings returned. AVS printed and all questions answered; toth education discussed.

## 2025-07-22 NOTE — PROGRESS NOTES
[facility] MEDICINE PROGRESS NOTE      Identification/Summary: Bernice Umaña is a 69 year old female with a past medical history of  chronic ulcerating interstitial cystitis, urinary urge and stress incontinence, moderate persistent asthma, GLADYS with CPAP, hypercholesterolemia, prediabetes, peripheral neuropathy, GERD with remote history of Augustine's esophagus, depression, hepatic steatosis who was admitted on 7/21/2025 s/p cystoscopy with bladder biopsy and fulguration. There were no complications per op note and initial plan was to discharge patient home from PACU; however, patient reported significant pain and bladder spasms, so she was admitted for pain management. Salvador catheter remains in place.  Stable on hospital day 1.  Will be discharging home per urology..    Assessment and Plan:  Chronic obstructive interstitial cystitis  Status post cystoscopy with bladder biopsy and fulguration  Overactive bladder  Pyridium 200 mg 3 times a day  Detrol LA 4 mg daily  Vibegron 75 mg daily  Cephalexin 250 mg at bedtime  Pain control with Norco 5/325 mg every 6 hours as needed    Moderate persistent asthma without exacerbation  Stable and asymptomatic  Breo inhaler daily  Singulair 10 mg daily    Dyslipidemia  Crestor 5 mg daily    Obstructive sleep apnea  Compliant with CPAP use    Moderate obesity Body mass index is 39.25 kg/m .  Modification of lifestyle for weight loss    Prediabetes   Most recent A1c is 6.4 on 5/5/2025.   Not on antidiabetic medication  Resume diabetic diet and follow-up with primary care physician closely     Peripheral neuropathy   Primary neuropathy source is her lower extremities bilaterally.  Resume gabapentin 400mg BID      Major depressive disorder   Sertraline 100mg once daily      GERD  Remote history of Augustine's esophagus  Omeprazole 40mg every morning     Code full  DVT prophylaxis  Early ambulation and SCDs    Medically Ready for Discharge: Anticipated  "Today      Clinically Significant Risk Factors Present on Admission                             # Obesity: Estimated body mass index is 39.25 kg/m  as calculated from the following:    Height as of this encounter: 1.676 m (5' 6\").    Weight as of this encounter: 110.3 kg (243 lb 3.2 oz).       # Asthma: noted on problem list        Diet: Regular Diet Adult  Toth Catheter: PRESENT, indication: Surgical procedure  Lines: None          Sharon Preciado MD  [facility] Medicine  [facility]  Phone: #[phone]  Securely message with the Vocera Web Console (learn more here)  Text page via [employer] Paging/Directory     Interval History/Subjective:  Resting comfortably.  Toth catheter in place, voiding urine well.  Minimal bladder spasm.  Afebrile.  Evaluated by urology.  Will be discharging home today.    Physical Exam/Objective:  Temp:  [97.1  F (36.2  C)-98.4  F (36.9  C)] 97.5  F (36.4  C)  Pulse:  [53-92] 92  Resp:  [16-18] 18  BP: (135-176)/(63-94) 151/83  Cuff Mean (mmHg):  [102] 102  SpO2:  [91 %-97 %] 97 %  Body mass index is 39.25 kg/m .     GENERAL:  Alert, appears comfortable, in no acute distress, appears stated age, obese   HEAD:  Normocephalic, without obvious abnormality, atraumatic   EYES:  PERRL, conjunctiva  clear,  EOM's intact   NOSE: Nares normal,  mucosa normal, no drainage   THROAT: Lips, mucosa,  gums normal, mouth moist   NECK: Supple, symmetrical, trachea midline   BACK:   Symmetric, no curvature, ROM normal   LUNGS:   Clear to auscultation bilaterally, no rales, rhonchi, or wheezing, symmetric chest rise on inhalation, respirations unlabored   CHEST WALL:  No tenderness or deformity   HEART:  Regular rate and rhythm, S1 and S2 normal, no murmur    ABDOMEN:   Soft, non-tender, bowel sounds active, no masses, no organomegaly, no rebound or guarding, toth catheter in place   EXTREMITIES: No BLE edema    SKIN: No rashes in the visualized areas   NEURO: Alert, " oriented x3, moves all four extremities freely   PSYCH: Cooperative, behavior is appropriate      Data reviewed today: I personally reviewed all new medications, labs, imaging/diagnostics reports over the past 24 hours. Pertinent findings include:    Imaging:   Abdomen ct 7/17  1.  Urinary bladder is completely decompressed. Mild perivesicular   stranding could reflect ongoing cystitis. Correlate with urinalysis.   2.  Right lower lobe clustered ground-glass nodules measuring up to 4   mm are probably infectious/inflammatory.     Labs:  Most Recent 3 CBC's:  Recent Labs   Lab Test 07/22/25  0735 11/01/24  0951 02/13/23  0650   WBC 11.2* 10.9 8.9   HGB 14.6 14.0 12.0   MCV 96 93 98    258 217     Most Recent 3 BMP's:  Recent Labs   Lab Test 07/22/25  0735 07/22/25  0605 11/01/24  0951 02/14/23  1046 02/13/23  0650 02/12/23  0810     --   --   --  145 142   POTASSIUM 4.5  --   --  3.6 3.9 4.0   CHLORIDE 103  --   --   --  112* 111*   CO2 23  --   --   --  24 23   BUN 21.0  --   --   --  9 12   CR 0.80  --  0.91  --  0.65  0.65 0.64   ANIONGAP 10  --   --   --  9 8   PIETRO 9.6  --   --   --  8.8 8.9   * 110*  --   --  91 99        Medications:   Personally Reviewed.  Medications   Current Facility-Administered Medications   Medication Dose Route Frequency Provider Last Rate Last Admin     Current Facility-Administered Medications   Medication Dose Route Frequency Provider Last Rate Last Admin    cephALEXin (KEFLEX) capsule 250 mg  250 mg Oral At Bedtime Fatimah Handley PA-C   250 mg at 07/21/25 2131    cyclobenzaprine (FLEXERIL) tablet 10 mg  10 mg Oral TID Rufina Shaw MD   10 mg at 07/21/25 2131    fluticasone-vilanterol (BREO ELLIPTA) 200-25 MCG/ACT inhaler 1 puff  1 puff Inhalation Daily Fatimah Handley PA-C        gabapentin (NEURONTIN) capsule 400 mg  400 mg Oral BID Fatimah Handley PA-C   400 mg at 07/21/25 2131    hydrOXYzine HCl (ATARAX) tablet 10 mg  10 mg Oral Daily Fatimah Handley PA-C         ibuprofen (ADVIL/MOTRIN) tablet 600 mg  600 mg Oral Once Rufina Shaw MD        montelukast (SINGULAIR) tablet 10 mg  10 mg Oral Daily Fatimah Handley PA-C        pantoprazole (PROTONIX) EC tablet 40 mg  40 mg Oral BID Fatimah Handley PA-C        phenazopyridine (PYRIDIUM) tablet 200 mg  200 mg Oral TID w/meals Geraldo Minaya, CNP        rosuvastatin (CRESTOR) tablet 5 mg  5 mg Oral Daily Fatimah Handley PA-C        sertraline (ZOLOFT) tablet 100 mg  100 mg Oral Daily Fatimah Handley PA-C        sodium chloride (PF) 0.9% PF flush 3 mL  3 mL Intracatheter Q8H MISSY Rufina Shaw MD   3 mL at 07/22/25 0601    tolterodine ER (DETROL LA) 24 hr capsule 4 mg  4 mg Oral Daily Fatimah Handley PA-C        vibegron (GEMTESA) tablet 75 mg  75 mg Oral Daily Fatimah Handley PA-C          Total time spent 40 min

## 2025-07-22 NOTE — CONSULTS
[facility]  Consult Note - Hospitalist Service  Date of Admission:  7/21/2025  Consult Requested by: Urology  Reason for Consult: Postoperative Medication Management    Assessment & Plan   Bernice Umaña is a 69 year old female with PMHx of chronic ulcerating interstitial cystitis, urinary urge and stress incontinence, moderate persistent asthma, GLADYS with CPAP, hypercholesterolemia, prediabetes, peripheral neuropathy, GERD with remote history of Augustine's esophagus, depression, hepatic steatosis who was admitted on 7/21/2025 s/p cystoscopy with bladder biopsy and fulguration. There were no complications per op note and initial plan was to discharge patient home from PACU; however, patient reported significant pain and bladder spasms, so she was admitted for pain management. Salvador catheter remains in place.       S/p cystoscopy with bladder biopsy and fulguration   Chronic ulcerative interstitial cystitis   Overactive bladder  Reviewed pre-op labs (7/17/2025): CBC with mildly elevated white count 12.4, remainder unremarkable, hemoglobin 14.4.  BMP with elevated BUN 25, normal creatinine 0.94, remainder unremarkable.  Recent ED visit for suspected UTI on 7/17 and was started on course of Levaquin; however, urine culture resulted on 7/19 with 50-100k mixed Gram positive bryson.  - Resume PTA cephalexin prophylaxis   - Discontinue PTA Levaquin given mixed bryson on culture  - Resume PTA hydroxyzine   - Resume PTA solifenacin (Vesicare) and vibegron   - Hold Robaxin for bladder spasms, was started on trial of Flexeril per Urology team  - Salvador catheter in place   - Analgesics, antiemetics, PT/OT, and DVT prophylaxis per primary/urology team    Asthma, moderate persistent, without exacerbation   Has been satting well. Currently on 2L/NC postop.   - Resume PTA Breo Ellipta inhaler, Singulair, and Zyrtec  - Supplemental oxygen via NC as needed, wean as tolerated  - Pulse ox    Obstructive sleep apnea  "  - Use of home CPAP     Hypercholesterolemia   - Resume PTA rosuvastatin 5mg once daily     Prediabetes   Most recent A1c is 6.4 on 5/5/2025. Not on any PTA medications for this problem.  - Check glucose in AM    Peripheral neuropathy   Primary neuropathy source is her lower extremities bilaterally.  - Resume gabapentin 400mg BID     Major depressive disorder   - Resume PTA sertraline 100mg once daily     GERD  Remote history of Augustine's esophagus  - Resume PTA omeprazole 40mg every morning        The patient's care was discussed with the hospitalist, Dr. Parviz Ruffin.    Clinically Significant Risk Factors Present on Admission                             # Obesity: Estimated body mass index is 38.41 kg/m  as calculated from the following:    Height as of this encounter: 1.676 m (5' 6\").    Weight as of this encounter: 108 kg (238 lb).       # Asthma: noted on problem list        BROOKLYNN Block Student   [facility]    I, Fatimah Handley PA-C, was present to oversee interview and exam that was performed by the above PA student. I also personally examined the patient as documented below. I agree with the information documented in this note with edits made as necessary.      Fatimah Handley PA-C  Hospitalist Service  Securely message with [employer] (more info)  Text page via [facility] Paging/Directory   ______________________________________________________________________    Chief Complaint   CC: Postop pain   History is obtained from the patient    History of Present Illness   Bernice Umaña is a 69 year old female who presents s/p cystoscopy with bladder biopsy and fulguration, admitted for postop pain management. Reports her pain is related to uncontrolled bladder spasms and urinary incontinence. The pain is not very well controlled post-operatively, helps to lay on her right side. Reports symptoms like burning when she pees, helps that she has the toth catheter in. Denies nausea and vomiting, tolerating PO " intake. No chest pain, palpitations, or SOB. Denies tobacco, alcohol, or other recreational drug use. Ambulates without assistive devices at baseline. Manages her own prescription medications.    Patient reported bladder spasms prior to procedure as well.  She has been dealing with interstitial cystitis for about a year and a half.  She has tried several medications and has worked closely with urology to find a regimen that works for her, but the past month has been difficult due to pain.  She has also cut out caffeine, alcohol, and chocolate without improvement.  She states that when she gets a bladder spasm, she will urinate into an incontinence brief, which provides relief from the spasm.       Past Medical History    Past Medical History:   Diagnosis Date    Arthritis     Asthma     Gastro-oesophageal reflux disease     Sleep apnea        Past Surgical History   Past Surgical History:   Procedure Laterality Date    ARTHROPLASTY KNEE BILATERAL  10/8/2012    Procedure: ARTHROPLASTY KNEE BILATERAL;  Bilateral Total Knee Arthroplasty;  Surgeon: Nino Andre MD;  Location: [facility] OR    HYSTERECTOMY         Medications   I have reviewed this patient's current medications       Review of Systems    The 10 point Review of Systems is negative other than noted in the HPI or here.     Social History   I have reviewed this patient's social history and updated it with pertinent information if needed.  Social History     Tobacco Use    Smoking status: Never     Passive exposure: Never    Smokeless tobacco: Never   Substance Use Topics    Alcohol use: Yes     Comment: 2 monthly    Drug use: No         Allergies   Allergies   Allergen Reactions    Sulfa Antibiotics Hives    Tramadol Nausea    Latex Rash    Macrobid [Nitrofurantoin Anhydrous] Rash        Physical Exam   Vital Signs: Temp: 98.4  F (36.9  C) Temp src: Oral BP: 136/63 Pulse: 56   Resp: 18 SpO2: 96 % O2 Device: Nasal cannula Oxygen Delivery: 2 LPM  Weight: 238 lbs 0  oz    Exam performed by Fatimah Handley PA-C  General Appearance: Awake though tired appearing, alert, in no acute distress.  Respiratory: Clear to posterior auscultation bilaterally. Normal respiratory effort on 2L/NC.  Cardiovascular: Regular rate and rhythm, no murmur.   GI: Soft, non-distended.  Some tenderness to palpation over suprapubic area.  Active bowel sounds.  : Toth catheter in place, draining clear orange-colored urine (received Pyridium earlier today).  Skin: Warm, dry. No obvious rashes or lesions on observed skin.  Musculoskeletal: Able to move all extremities freely. Trace pitting bilateral lower extremity edema.  Neurologic: Alert and oriented x 3.   Psychiatric: Calm, pleasant, cooperative with exam.      Exam performed by BROOKLYNN Everett  General Appearance: Well-appearing, alert, awake, in no acute distress  Respiratory: Nonlabored breathing on 2L/NC. Lungs clear to auscultation bilaterally without wheezing or crackles.   Cardiovascular: Normal rate and rhythm without murmurs, rubs, or gallops. Trace edema to bilateral lower extremities   GI: Normoactive bowel sounds. Soft and nondistended. Lower quadrants tender to palpation without guarding or rebound tenderness.  Skin: No obvious lesions, rashes, or bruising to observable skin.   : Urine in toth bag is clear and orange in appearance.   Psych: Calm, pleasant, cooperative with exam.    Medical Decision Making       55 MINUTES SPENT BY ME on the date of service doing chart review, history, exam, documentation & further activities per the note.      Data         Imaging results reviewed over the past 24 hrs:   No results found for this or any previous visit (from the past 24 hours).

## 2025-07-22 NOTE — DISCHARGE SUMMARY
[facility] DISCHARGE SUMMARY    Name: Bernice Umaña  : 1955  MRN: [id number]  PCP: Hue Sheppard    Place of service: [facility]    Admission date: 2025   Discharge date: 2025     Surgeon: Dr. Shaw     Surgical Procedure: Cystoscopy bladder biopsy and fulguration     Date of surgery: 2025    Principal Diagnosis at Discharge:   Interstitial cystitis [N30.10]     Other diagnosis addressed during hospitalization:  Chronic ulcerative interstitial cystitis  Overactive bladder   Asthma  GLADYS  Hypercholesterolemia  Prediabetes  Peripheral neuropathy  Major depressive disorder  GERD    Consults:  Medicine    Diagnostic Studies :  None    Complications while in the Hospital:  Post-op bladder spasms/pain    Physical Exam:  Temp: 97.5  F (36.4  C) Temp src: Oral BP: (!) 151/83 Pulse: 92   Resp: 18 SpO2: 97 % O2 Device: None (Room air) Oxygen Delivery: 2 LPM    Gen: nad, alert, sitting up in chair.   Neuro: A&O x 3. Moving all extremities equally.   Resp: Reg resp rate/depth.   Abdomen: Soft, non-tender, non-distended. Mild SP tenderness with palpation. Mild bilateral flank tenderness.   : Toth draining yellow urine with small amount of white debris.   LE: CWMS intact.    Brief history of hospital course:  This is a 69 year old female admitted for Interstitial cystitis [N30.10]. Patient underwent above procedure. Patient tolerated the procedure well. Hospitalist was consulted to assist with medical management.  Post operative course was remarkable for significant post-op bladder pain/spasms. By the day of discharge, the patient was ambulatory, tolerating diet, pain was controlled/improving with oral analgesics, labs and vitals stable. Discharged with toth.     Labs:  Hemoglobin   Date Value Ref Range Status   2025 14.6 11.7 - 15.7 g/dL Final   10/11/2012 9.9 (L) 11.7 - 15.7 g/dL Final     Platelet Count   Date Value Ref Range Status   2025 262 150 - 450 10e3/uL  Final     WBC Count   Date Value Ref Range Status   07/22/2025 11.2 (H) 4.0 - 11.0 10e3/uL Final     Creatinine   Date Value Ref Range Status   07/22/2025 0.80 0.51 - 0.95 mg/dL Final     Potassium   Date Value Ref Range Status   07/22/2025 4.5 3.4 - 5.3 mmol/L Final   02/14/2023 3.6 3.5 - 5.0 mmol/L Final      Pending Labs:  Surgical Pathology     DISPOSITION: Home    DISCHARGE CONDITION: Good/Stable    DISCHARGE MEDICATIONS:      Review of your medicines        START taking        Dose / Directions   acetaminophen 325 MG tablet  Commonly known as: TYLENOL  Used for: Interstitial cystitis      Dose: 325-650 mg  Take 1-2 tablets (325-650 mg) by mouth every 6 hours as needed for mild pain.  Refills: 0     cyclobenzaprine 10 MG tablet  Commonly known as: FLEXERIL  Used for: Interstitial cystitis      Dose: 10 mg  Take 1 tablet (10 mg) by mouth 3 times daily for 3 days.  Quantity: 9 tablet  Refills: 0     HYDROcodone-acetaminophen 5-325 MG tablet  Commonly known as: NORCO      Dose: 1 tablet  Take 1 tablet by mouth every 6 hours as needed for moderate to severe pain.  Quantity: 15 tablet  Refills: 0     phenazopyridine 200 MG tablet  Commonly known as: PYRIDIUM  Used for: Interstitial cystitis      Dose: 200 mg  Take 1 tablet (200 mg) by mouth 3 times daily as needed for irritation.  Refills: 0            CONTINUE these medicines which have NOT CHANGED        Dose / Directions   albuterol (2.5 MG/3ML) 0.083% neb solution  Commonly known as: PROVENTIL      Dose: 2.5 mg  Take 2.5 mg by nebulization every 4 hours as needed for shortness of breath, wheezing or cough  Refills: 0     cephALEXin 250 MG capsule  Commonly known as: KEFLEX      Dose: 250 mg  Take 250 mg by mouth at bedtime.  Refills: 0     cetirizine 10 MG tablet  Commonly known as: zyrTEC      Dose: 10 mg  Take 10 mg by mouth daily as needed.  Refills: 0     clobetasol 0.05 % Gel topical gel  Commonly known as: TEMOVATE      Apply topically daily as needed  (rash).  Refills: 0     estradiol 0.01% in vanicream - PHARMACY TO MIX - dispense with applicator      Dose: 2 g  Place 2 g vaginally twice a week.  Refills: 0     fluticasone-vilanterol 200-25 MCG/ACT inhaler  Commonly known as: BREO ELLIPTA      Dose: 1 puff  Inhale 1 puff into the lungs daily  Refills: 0     gabapentin 400 MG capsule  Commonly known as: NEURONTIN      Dose: 400 mg  Take 400 mg by mouth 2 times daily.  Refills: 0     Gemtesa 75 MG Tabs tablet  Generic drug: vibegron      Dose: 75 mg  Take 75 mg by mouth daily.  Refills: 0     hydrOXYzine HCl 10 MG tablet  Commonly known as: ATARAX      Dose: 10 mg  Take 10 mg by mouth daily.  Refills: 0     ipratropium - albuterol 0.5 mg/2.5 mg (3mg)/3 mL 0.5-2.5 (3) MG/3ML neb solution  Commonly known as: DUONEB      Dose: 1 vial  Take 1 vial by nebulization every 6 hours as needed for shortness of breath, wheezing or cough  Refills: 0     methocarbamol 500 MG tablet  Commonly known as: ROBAXIN      Dose: 500 mg  Take 500 mg by mouth 3 times daily as needed for muscle spasms.  Refills: 0     metroNIDAZOLE 0.75 % external cream  Commonly known as: METROCREAM      Apply topically 2 times daily as needed (rash).  Refills: 0     montelukast 10 MG tablet  Commonly known as: SINGULAIR      Dose: 10 mg  Take 10 mg by mouth daily  Refills: 0     naproxen sodium 220 MG tablet  Commonly known as: ANAPROX      Dose: 220 mg  Take 220 mg by mouth every 12 hours as needed for moderate pain.  Refills: 0     omeprazole 40 MG DR capsule  Commonly known as: PriLOSEC      Dose: 40 mg  Take 40 mg by mouth daily.  Refills: 0     rosuvastatin 5 MG tablet  Commonly known as: CRESTOR      Dose: 5 mg  Take 5 mg by mouth daily.  Refills: 0     sertraline 100 MG tablet  Commonly known as: ZOLOFT      Dose: 100 mg  Take 100 mg by mouth daily  Refills: 0     solifenacin 10 MG tablet  Commonly known as: VESICARE      Dose: 10 mg  Take 10 mg by mouth daily.  Refills: 0     VITAMIN D  (CHOLECALCIFEROL) PO      Dose: 2,000 Units  Take 2,000 Units by mouth daily  Refills: 0            STOP taking      COMPRESSION STOCKINGS        COMPRESSION STOCKINGS        levofloxacin 750 MG tablet  Commonly known as: LEVAQUIN        predniSONE 10 MG tablet  Commonly known as: DELTASONE        predniSONE 5 MG tablet  Commonly known as: DELTASONE                  Where to get your medicines        These medications were sent to [id number] - [place], [place] - [address] [place]  [address] [address]      Phone: [phone]   cyclobenzaprine 10 MG tablet       Some of these will need a paper prescription and others can be bought over the counter. Ask your nurse if you have questions.    Bring a paper prescription for each of these medications  HYDROcodone-acetaminophen 5-325 MG tablet  You don't need a prescription for these medications  acetaminophen 325 MG tablet         DISCHARGE PLAN:   - Follow up with Dr. Shaw as scheduled prior to your procedure   - Follow up with Hue Sheppard as directed   - Take medication as prescribed, avoid anticoagulants per surgeon and PCP recommendations.   - Wound / drain care: As directed prior to discharge  - Physical activity: As tolerated, no heavy lifting. No driving while taking narcotics.   - Diet: Regular diet.   - Medication: please review discharge Med req/AVS  - Warning signs discussed with patient about when to call the clinic/hospital  - All questions and concerns were answered for the patient prior to discharge  - Patient verbalized understanding.     Discussed patient with Dr. Shaw on day of discharge.     Geraldo Minaya, APRN, CNP  [facility]   [phone]     I saw the patient on the date of discharge  Total time spent for discharge on date of discharge: 20 minutes    Physician(s) in addition to primary physician who should receive a copy:  CC1: Hue Sheppard            ?

## 2025-07-23 ENCOUNTER — PATIENT OUTREACH (OUTPATIENT)
Dept: CARE COORDINATION | Facility: CLINIC | Age: 70
End: 2025-07-23
Payer: COMMERCIAL

## 2025-07-23 LAB
PATH REPORT.COMMENTS IMP SPEC: NORMAL
PATH REPORT.FINAL DX SPEC: NORMAL
PATH REPORT.GROSS SPEC: NORMAL
PATH REPORT.MICROSCOPIC SPEC OTHER STN: NORMAL
PATH REPORT.RELEVANT HX SPEC: NORMAL
PHOTO IMAGE: NORMAL

## 2025-07-23 PROCEDURE — 88305 TISSUE EXAM BY PATHOLOGIST: CPT | Mod: 26 | Performed by: PATHOLOGY

## 2025-07-23 PROCEDURE — 88341 IMHCHEM/IMCYTCHM EA ADD ANTB: CPT | Mod: 26 | Performed by: PATHOLOGY

## 2025-07-23 PROCEDURE — 88342 IMHCHEM/IMCYTCHM 1ST ANTB: CPT | Mod: 26 | Performed by: PATHOLOGY

## 2025-07-24 LAB — BACTERIA UR CULT: NO GROWTH

## (undated) DEVICE — PREP DYNA-HEX 4% CHG SCRUB 4OZ BOTTLE MDS098710

## (undated) DEVICE — DRSG TELFA 3X4" 1050

## (undated) DEVICE — CUSTOM PACK CYSTO PREFERRED SOT5BCYHEA

## (undated) DEVICE — NEEDLE SPINAL DISP 18GA X 3.5" QUINCKE 333350

## (undated) DEVICE — GOWN XLG DISP 9545

## (undated) DEVICE — MAT FLOOR WATERPROOF BACKSHEET FMBP30

## (undated) DEVICE — SOLUTION IV WATER 3L HYPOTONIC R8006

## (undated) DEVICE — BAG URINARY DRAIN 2000ML LF 154002

## (undated) DEVICE — SPONGE RAY-TEC 4X8" 7318

## (undated) DEVICE — Device

## (undated) DEVICE — GOWN LG DISP 9515

## (undated) DEVICE — SUCTION MANIFOLD NEPTUNE 2 SYS 1 PORT 702-025-000

## (undated) DEVICE — TUBING IRR LG BORE TUBE DRIP CHMBR 2 BG 94IN 313003

## (undated) DEVICE — TUBING SUCTION MEDI-VAC 1/4"X20' N620A

## (undated) DEVICE — GLOVE PI ULTRATCH M LF SZ 6.5 PF CUFF TEXT STRL LF 42665

## (undated) DEVICE — SOLUTION WATER 1000ML BOTTLE R5000-01

## (undated) RX ORDER — ONDANSETRON 2 MG/ML
INJECTION INTRAMUSCULAR; INTRAVENOUS
Status: DISPENSED
Start: 2025-07-21

## (undated) RX ORDER — FENTANYL CITRATE 50 UG/ML
INJECTION, SOLUTION INTRAMUSCULAR; INTRAVENOUS
Status: DISPENSED
Start: 2025-07-21

## (undated) RX ORDER — PROPOFOL 10 MG/ML
INJECTION, EMULSION INTRAVENOUS
Status: DISPENSED
Start: 2025-07-21

## (undated) RX ORDER — LIDOCAINE HYDROCHLORIDE 10 MG/ML
INJECTION, SOLUTION EPIDURAL; INFILTRATION; INTRACAUDAL; PERINEURAL
Status: DISPENSED
Start: 2025-07-21

## (undated) RX ORDER — DEXAMETHASONE SODIUM PHOSPHATE 4 MG/ML
INJECTION, SOLUTION INTRA-ARTICULAR; INTRALESIONAL; INTRAMUSCULAR; INTRAVENOUS; SOFT TISSUE
Status: DISPENSED
Start: 2025-07-21